# Patient Record
Sex: FEMALE | Race: WHITE | Employment: FULL TIME | URBAN - METROPOLITAN AREA
[De-identification: names, ages, dates, MRNs, and addresses within clinical notes are randomized per-mention and may not be internally consistent; named-entity substitution may affect disease eponyms.]

---

## 2021-02-15 ENCOUNTER — HOSPITAL ENCOUNTER (OUTPATIENT)
Age: 26
Discharge: HOME | End: 2021-02-15
Attending: SPECIALIST | Admitting: SPECIALIST
Payer: COMMERCIAL

## 2021-02-15 VITALS
DIASTOLIC BLOOD PRESSURE: 71 MMHG | SYSTOLIC BLOOD PRESSURE: 123 MMHG | TEMPERATURE: 98.2 F | RESPIRATION RATE: 16 BRPM | HEART RATE: 73 BPM

## 2021-02-15 PROBLEM — Z3A.36 36 WEEKS GESTATION OF PREGNANCY: Status: ACTIVE | Noted: 2021-02-15

## 2021-02-15 PROCEDURE — 59025 FETAL NON-STRESS TEST: CPT

## 2021-02-15 PROCEDURE — 59025 FETAL NON-STRESS TEST: CPT | Performed by: MIDWIFE

## 2021-02-15 NOTE — PROGRESS NOTES
presents to unit for scheduled NST d/t pt taking zoloft and polyhydramnios. Pt denies vaginal bleeding, lof, and reports occasional mild contractions. Perceives positive fetal movement. EDC: 3/12/2021. 36 weeks 3 days gestation. Placed on efm. NST button provided and pt instructed to press button with each fetal movement felt.

## 2021-02-15 NOTE — PROGRESS NOTES
Written and verbal discharge instructions reviewed with pt. Pt verbalized understanding with no further questions or concern. Pt to follow up with her dr with any concerns. Will return for next scheduled NST on Friday. Left unit ambulatory.

## 2021-02-15 NOTE — H&P
Patient is ,No LMP recorded. Patient is pregnant. ,  36w3d here for NST.     Estimated Date of Delivery: 3/12/21    Reason for NST: medication exposure     /71   Pulse 73   Temp 98.2 °F (36.8 °C) (Oral)   Resp 16     Contractions: none    FHR:120,  Variability: moderate,   Accelerations: present, Decelerations: none    Assessment NST is reactive

## 2021-02-19 ENCOUNTER — HOSPITAL ENCOUNTER (OUTPATIENT)
Age: 26
Discharge: HOME | End: 2021-02-19
Attending: SPECIALIST | Admitting: SPECIALIST
Payer: COMMERCIAL

## 2021-02-19 VITALS
SYSTOLIC BLOOD PRESSURE: 118 MMHG | RESPIRATION RATE: 16 BRPM | DIASTOLIC BLOOD PRESSURE: 63 MMHG | TEMPERATURE: 98.1 F | HEART RATE: 80 BPM

## 2021-02-19 PROBLEM — Z3A.37 37 WEEKS GESTATION OF PREGNANCY: Status: ACTIVE | Noted: 2021-02-19

## 2021-02-19 PROCEDURE — 59025 FETAL NON-STRESS TEST: CPT | Performed by: OBSTETRICS & GYNECOLOGY

## 2021-02-19 PROCEDURE — 59025 FETAL NON-STRESS TEST: CPT

## 2021-02-19 SDOH — HEALTH STABILITY: MENTAL HEALTH: HOW OFTEN DO YOU HAVE A DRINK CONTAINING ALCOHOL?: NEVER

## 2021-02-19 NOTE — PROGRESS NOTES
Maternal Vitals  Temp: 98.1 °F (36.7 °C)  Temp Source: Oral  BP: 118/63  Pulse: 80  Resp: 16    Testing  Reason for NST: Polyhydramnios  Explained test to patient: Yes              Plan: Reactive NST - Home    Ellen Puente [facility]  2/19/2021

## 2021-02-19 NOTE — PROGRESS NOTES
Discharge instructions provided and explained to patient. Patient verbalized understanding. Patient has no further questions. Patient left unit ambulatory.

## 2021-02-19 NOTE — PROGRESS NOTES
. 37.0 weeks gestation. Presents to unit for scheduled NST for patient taking zoloft and polyhydramnios. Patient denies lof, vb, ctxs. States positive fm. NST button provided and explained.

## 2021-02-22 ENCOUNTER — HOSPITAL ENCOUNTER (OUTPATIENT)
Age: 26
Discharge: HOME | End: 2021-02-22
Attending: SPECIALIST | Admitting: SPECIALIST
Payer: COMMERCIAL

## 2021-02-22 VITALS
HEART RATE: 75 BPM | TEMPERATURE: 99.2 F | RESPIRATION RATE: 16 BRPM | SYSTOLIC BLOOD PRESSURE: 124 MMHG | DIASTOLIC BLOOD PRESSURE: 68 MMHG

## 2021-02-22 PROBLEM — Z36.89 NST (NON-STRESS TEST) REACTIVE: Status: ACTIVE | Noted: 2021-02-22

## 2021-02-22 PROCEDURE — 59025 FETAL NON-STRESS TEST: CPT

## 2021-02-22 NOTE — PROGRESS NOTES
Patient presents to l&d at 37.3 weeks gestation for scheduled NST. Patient gets NST's for polyhydramnios and took zoloft previously in pregnancy. Patient denies lof, vb, and ctx's. +FM. EFM applied. NST button in hand. .

## 2021-02-26 ENCOUNTER — HOSPITAL ENCOUNTER (OUTPATIENT)
Age: 26
Discharge: HOME | End: 2021-02-26
Attending: SPECIALIST | Admitting: SPECIALIST
Payer: COMMERCIAL

## 2021-02-26 VITALS
SYSTOLIC BLOOD PRESSURE: 120 MMHG | HEART RATE: 75 BPM | TEMPERATURE: 98.3 F | DIASTOLIC BLOOD PRESSURE: 76 MMHG | RESPIRATION RATE: 16 BRPM

## 2021-02-26 PROBLEM — Z3A.38 38 WEEKS GESTATION OF PREGNANCY: Status: ACTIVE | Noted: 2021-02-26

## 2021-02-26 PROCEDURE — 59025 FETAL NON-STRESS TEST: CPT

## 2021-02-26 PROCEDURE — 59025 FETAL NON-STRESS TEST: CPT | Performed by: MIDWIFE

## 2021-02-26 RX ORDER — SERTRALINE HYDROCHLORIDE 100 MG/1
100 TABLET, FILM COATED ORAL DAILY
COMMUNITY

## 2021-02-26 NOTE — H&P
Patient is ,No LMP recorded. Patient is pregnant. ,  38w0d here for NST.     Estimated Date of Delivery: 3/12/21    Reason for NST: polyhydramnios    /76   Pulse 75   Temp 98.3 °F (36.8 °C) (Oral)   Resp 16     Contractions: irregular, patient is only feeling some of them and states they are not painful    FHR:125,  Variability: moderate,   Accelerations: present, Decelerations: none    Assessment NST is reactive   Discussed with Dr. Esther Osorio  Discharge to home

## 2021-02-26 NOTE — PROGRESS NOTES
Discharge instructions given to pt. Pt verbalizes understanding of instructions. Pt leaves unit ambulatory.

## 2021-02-26 NOTE — PROGRESS NOTES
38 weeks presents to L&D for scheduled NST for polyhydramnios. Pt denies ctxs, lof, or vb. States positive fetal movement. EFM applied. NST button given and explained.

## 2021-03-07 ENCOUNTER — APPOINTMENT (OUTPATIENT)
Dept: LABOR AND DELIVERY | Age: 26
End: 2021-03-07
Payer: COMMERCIAL

## 2021-03-07 ENCOUNTER — HOSPITAL ENCOUNTER (INPATIENT)
Age: 26
LOS: 3 days | Discharge: HOME | End: 2021-03-10
Attending: OBSTETRICS & GYNECOLOGY | Admitting: OBSTETRICS & GYNECOLOGY
Payer: COMMERCIAL

## 2021-03-07 ENCOUNTER — ANESTHESIA EVENT (OUTPATIENT)
Dept: LABOR AND DELIVERY | Age: 26
End: 2021-03-07
Payer: COMMERCIAL

## 2021-03-07 ENCOUNTER — ANESTHESIA (OUTPATIENT)
Dept: LABOR AND DELIVERY | Age: 26
End: 2021-03-07
Payer: COMMERCIAL

## 2021-03-07 PROBLEM — O99.820 GBS (GROUP B STREPTOCOCCUS CARRIER), +RV CULTURE, CURRENTLY PREGNANT: Status: ACTIVE | Noted: 2021-03-07

## 2021-03-07 PROBLEM — Z3A.36 36 WEEKS GESTATION OF PREGNANCY: Status: RESOLVED | Noted: 2021-02-15 | Resolved: 2021-03-07

## 2021-03-07 PROBLEM — Z3A.39 39 WEEKS GESTATION OF PREGNANCY: Status: ACTIVE | Noted: 2021-03-07

## 2021-03-07 PROBLEM — Z36.89 NST (NON-STRESS TEST) REACTIVE: Status: RESOLVED | Noted: 2021-02-22 | Resolved: 2021-03-07

## 2021-03-07 PROBLEM — Z3A.37 37 WEEKS GESTATION OF PREGNANCY: Status: RESOLVED | Noted: 2021-02-19 | Resolved: 2021-03-07

## 2021-03-07 PROBLEM — F12.91 HISTORY OF MARIJUANA USE: Status: ACTIVE | Noted: 2021-03-07

## 2021-03-07 PROBLEM — Z28.39 MATERNAL VARICELLA, NON-IMMUNE: Status: ACTIVE | Noted: 2021-03-07

## 2021-03-07 PROBLEM — O09.293 PREGNANCY IN THIRD TRIMESTER WITH HISTORY OF ABORTION: Status: ACTIVE | Noted: 2021-03-07

## 2021-03-07 PROBLEM — O09.93 HIGH-RISK PREGNANCY, THIRD TRIMESTER: Status: ACTIVE | Noted: 2021-03-07

## 2021-03-07 PROBLEM — F41.8 MIXED ANXIETY DEPRESSIVE DISORDER: Status: ACTIVE | Noted: 2021-03-07

## 2021-03-07 PROBLEM — O09.899 MATERNAL VARICELLA, NON-IMMUNE: Status: ACTIVE | Noted: 2021-03-07

## 2021-03-07 PROBLEM — Z3A.38 38 WEEKS GESTATION OF PREGNANCY: Status: RESOLVED | Noted: 2021-02-26 | Resolved: 2021-03-07

## 2021-03-07 PROBLEM — Z72.89 ENGAGES IN VAPING: Status: ACTIVE | Noted: 2021-03-07

## 2021-03-07 LAB
ABO/RH: NORMAL
AMPHETAMINE SCREEN, URINE: NOT DETECTED
ANTIBODY SCREEN: NORMAL
BARBITURATE SCREEN URINE: NOT DETECTED
BENZODIAZEPINE SCREEN, URINE: NOT DETECTED
CANNABINOID SCREEN URINE: POSITIVE
COCAINE METABOLITE SCREEN URINE: NOT DETECTED
FENTANYL SCREEN, URINE: NOT DETECTED
HCT VFR BLD CALC: 30.9 % (ref 34–48)
HEMOGLOBIN: 9.5 G/DL (ref 11.5–15.5)
Lab: ABNORMAL
MCH RBC QN AUTO: 24.6 PG (ref 26–35)
MCHC RBC AUTO-ENTMCNC: 30.7 % (ref 32–34.5)
MCV RBC AUTO: 80.1 FL (ref 80–99.9)
METHADONE SCREEN, URINE: NOT DETECTED
OPIATE SCREEN URINE: NOT DETECTED
OXYCODONE URINE: NOT DETECTED
PDW BLD-RTO: 13.3 FL (ref 11.5–15)
PHENCYCLIDINE SCREEN URINE: NOT DETECTED
PLATELET # BLD: 240 E9/L (ref 130–450)
PMV BLD AUTO: 10.9 FL (ref 7–12)
RBC # BLD: 3.86 E12/L (ref 3.5–5.5)
WBC # BLD: 8.6 E9/L (ref 4.5–11.5)

## 2021-03-07 PROCEDURE — 6370000000 HC RX 637 (ALT 250 FOR IP)

## 2021-03-07 PROCEDURE — 6370000000 HC RX 637 (ALT 250 FOR IP): Performed by: OBSTETRICS & GYNECOLOGY

## 2021-03-07 PROCEDURE — 2500000003 HC RX 250 WO HCPCS

## 2021-03-07 PROCEDURE — 3700000025 EPIDURAL BLOCK: Performed by: ANESTHESIOLOGY

## 2021-03-07 PROCEDURE — 80307 DRUG TEST PRSMV CHEM ANLYZR: CPT

## 2021-03-07 PROCEDURE — 2500000003 HC RX 250 WO HCPCS: Performed by: ANESTHESIOLOGY

## 2021-03-07 PROCEDURE — G0480 DRUG TEST DEF 1-7 CLASSES: HCPCS

## 2021-03-07 PROCEDURE — 1220000001 HC SEMI PRIVATE L&D R&B

## 2021-03-07 PROCEDURE — 36415 COLL VENOUS BLD VENIPUNCTURE: CPT

## 2021-03-07 PROCEDURE — 86900 BLOOD TYPING SEROLOGIC ABO: CPT

## 2021-03-07 PROCEDURE — 85027 COMPLETE CBC AUTOMATED: CPT

## 2021-03-07 PROCEDURE — 86901 BLOOD TYPING SEROLOGIC RH(D): CPT

## 2021-03-07 PROCEDURE — 2500000003 HC RX 250 WO HCPCS: Performed by: OBSTETRICS & GYNECOLOGY

## 2021-03-07 PROCEDURE — 6360000002 HC RX W HCPCS: Performed by: OBSTETRICS & GYNECOLOGY

## 2021-03-07 PROCEDURE — 2580000003 HC RX 258: Performed by: OBSTETRICS & GYNECOLOGY

## 2021-03-07 PROCEDURE — 51701 INSERT BLADDER CATHETER: CPT

## 2021-03-07 PROCEDURE — 86850 RBC ANTIBODY SCREEN: CPT

## 2021-03-07 RX ORDER — CLINDAMYCIN PHOSPHATE 900 MG/50ML
INJECTION INTRAVENOUS
Status: COMPLETED
Start: 2021-03-07 | End: 2021-03-07

## 2021-03-07 RX ORDER — LIDOCAINE HYDROCHLORIDE 10 MG/ML
INJECTION, SOLUTION INFILTRATION; PERINEURAL
Status: COMPLETED
Start: 2021-03-07 | End: 2021-03-08

## 2021-03-07 RX ORDER — ACETAMINOPHEN 650 MG
TABLET, EXTENDED RELEASE ORAL
Status: DISCONTINUED
Start: 2021-03-07 | End: 2021-03-08

## 2021-03-07 RX ORDER — NALBUPHINE HCL 10 MG/ML
5 AMPUL (ML) INJECTION EVERY 4 HOURS PRN
Status: DISCONTINUED | OUTPATIENT
Start: 2021-03-07 | End: 2021-03-08

## 2021-03-07 RX ORDER — BUPIVACAINE HYDROCHLORIDE 2.5 MG/ML
INJECTION, SOLUTION EPIDURAL; INFILTRATION; INTRACAUDAL
Status: DISCONTINUED
Start: 2021-03-07 | End: 2021-03-08

## 2021-03-07 RX ORDER — NALOXONE HYDROCHLORIDE 0.4 MG/ML
0.4 INJECTION, SOLUTION INTRAMUSCULAR; INTRAVENOUS; SUBCUTANEOUS PRN
Status: DISCONTINUED | OUTPATIENT
Start: 2021-03-07 | End: 2021-03-08

## 2021-03-07 RX ORDER — CLINDAMYCIN PHOSPHATE 900 MG/50ML
900 INJECTION INTRAVENOUS EVERY 8 HOURS
Status: DISCONTINUED | OUTPATIENT
Start: 2021-03-07 | End: 2021-03-08

## 2021-03-07 RX ORDER — ACETAMINOPHEN 325 MG/1
650 TABLET ORAL EVERY 4 HOURS PRN
Status: DISCONTINUED | OUTPATIENT
Start: 2021-03-07 | End: 2021-03-08

## 2021-03-07 RX ORDER — SODIUM CHLORIDE, SODIUM LACTATE, POTASSIUM CHLORIDE, CALCIUM CHLORIDE 600; 310; 30; 20 MG/100ML; MG/100ML; MG/100ML; MG/100ML
INJECTION, SOLUTION INTRAVENOUS CONTINUOUS
Status: DISCONTINUED | OUTPATIENT
Start: 2021-03-07 | End: 2021-03-08

## 2021-03-07 RX ORDER — ACETAMINOPHEN 325 MG/1
TABLET ORAL
Status: DISCONTINUED
Start: 2021-03-07 | End: 2021-03-08

## 2021-03-07 RX ORDER — ONDANSETRON 2 MG/ML
4 INJECTION INTRAMUSCULAR; INTRAVENOUS EVERY 6 HOURS PRN
Status: DISCONTINUED | OUTPATIENT
Start: 2021-03-07 | End: 2021-03-08

## 2021-03-07 RX ADMIN — Medication 25 MCG: at 07:19

## 2021-03-07 RX ADMIN — SODIUM CHLORIDE, POTASSIUM CHLORIDE, SODIUM LACTATE AND CALCIUM CHLORIDE: 600; 310; 30; 20 INJECTION, SOLUTION INTRAVENOUS at 16:52

## 2021-03-07 RX ADMIN — Medication 10 ML/HR: at 16:40

## 2021-03-07 RX ADMIN — CLINDAMYCIN IN 5 PERCENT DEXTROSE 900 MG: 18 INJECTION, SOLUTION INTRAVENOUS at 15:15

## 2021-03-07 RX ADMIN — SODIUM CHLORIDE, POTASSIUM CHLORIDE, SODIUM LACTATE AND CALCIUM CHLORIDE: 600; 310; 30; 20 INJECTION, SOLUTION INTRAVENOUS at 12:26

## 2021-03-07 RX ADMIN — BUTORPHANOL TARTRATE 2 MG: 2 INJECTION, SOLUTION INTRAMUSCULAR; INTRAVENOUS at 11:38

## 2021-03-07 RX ADMIN — SODIUM CHLORIDE, POTASSIUM CHLORIDE, SODIUM LACTATE AND CALCIUM CHLORIDE: 600; 310; 30; 20 INJECTION, SOLUTION INTRAVENOUS at 15:55

## 2021-03-07 RX ADMIN — CLINDAMYCIN IN 5 PERCENT DEXTROSE 900 MG: 18 INJECTION, SOLUTION INTRAVENOUS at 07:19

## 2021-03-07 RX ADMIN — ACETAMINOPHEN 650 MG: 325 TABLET ORAL at 21:18

## 2021-03-07 RX ADMIN — Medication 1 MILLI-UNITS/MIN: at 20:46

## 2021-03-07 RX ADMIN — CLINDAMYCIN IN 5 PERCENT DEXTROSE 900 MG: 18 INJECTION, SOLUTION INTRAVENOUS at 23:28

## 2021-03-07 RX ADMIN — SODIUM CHLORIDE, POTASSIUM CHLORIDE, SODIUM LACTATE AND CALCIUM CHLORIDE: 600; 310; 30; 20 INJECTION, SOLUTION INTRAVENOUS at 06:45

## 2021-03-07 RX ADMIN — Medication 15 ML/HR: at 23:50

## 2021-03-07 RX ADMIN — Medication 25 MCG: at 11:30

## 2021-03-07 RX ADMIN — SODIUM CHLORIDE, POTASSIUM CHLORIDE, SODIUM LACTATE AND CALCIUM CHLORIDE: 600; 310; 30; 20 INJECTION, SOLUTION INTRAVENOUS at 18:31

## 2021-03-07 ASSESSMENT — PAIN SCALES - GENERAL
PAINLEVEL_OUTOF10: 7
PAINLEVEL_OUTOF10: 3

## 2021-03-07 NOTE — PROGRESS NOTES
Dr Quirino Miller updated on pt comfortable with her epidural. IUPC placed. SVE 4/90/-1 at that time. No new orders at this time.

## 2021-03-07 NOTE — PROGRESS NOTES
Pt presents to LD for IOL. 39.2 weeks . Pt states +FM, denies LOF or VB. Pt seeing EL Robert for polyhydramnios. EFM applied, FHT reassuring, ctx presents.

## 2021-03-07 NOTE — PROGRESS NOTES
Dr Sheriff Tomy updated on SROM at [phone]. Contractions every 1-3 minutes, FHTs moderate variability, 120s. Pt currently bolusing for an epidural.  states to have house officer place an IUPC after epidural placement.

## 2021-03-07 NOTE — ANESTHESIA PRE PROCEDURE
Department of Anesthesiology  Preprocedure Note       Name:  Kenneth Lopez   Age:  30 y.o.  :  1995                                          MRN:  [id number]         Date:  3/7/2021      Surgeon:  Carlin Valentin    Procedure:     Medications prior to admission:   Prior to Admission medications    Medication Sig Start Date End Date Taking? Authorizing Provider   Prenatal MV-Min-Fe Fum-FA-DHA (PRENATAL 1 PO) Take 1 tablet by mouth daily   Yes Historical Provider, MD   sertraline (ZOLOFT) 100 MG tablet Take 100 mg by mouth daily   Yes Historical Provider, MD       Current medications:    Current Facility-Administered Medications   Medication Dose Route Frequency Provider Last Rate Last Admin    lactated ringers infusion   Intravenous Continuous Constanza Bernal  mL/hr at 21 0645 New Bag at 21 0645    oxytocin (PITOCIN) 10 unit bolus from the bag  10 Units Intravenous PRN Constanza Benral MD        And    oxytocin (PITOCIN) 30 units in 500 mL infusion  87.3 russel-units/min Intravenous PRN Constanza Bernal MD        [facility]) injection 4 mg  4 mg Intravenous Q6H PRN Constanza Bernal MD        clindamycin (CLEOCIN) 900 mg in dextrose 5 % 50 mL IVPB  900 mg Intravenous Lunda Aase, MD   Stopped at 21 0819    miSOPROStol (CYTOTEC) pre-split tablet TABS 25 mcg  25 mcg Vaginal Q4H Constanza Bernal MD   25 mcg at 21 0719    butorphanol (STADOL) injection 2 mg  2 mg Intravenous Q3H PRN Constanza Bernal MD           Allergies:     Allergies   Allergen Reactions    Augmentin [Amoxicillin-Pot Clavulanate] Nausea And Vomiting       Problem List:    Patient Active Problem List   Diagnosis Code    36 weeks gestation of pregnancy Z3A.36    Medication exposure during first trimester of pregnancy O09.891    37 weeks gestation of pregnancy Z3A.37    NST (non-stress test) reactive Z36.89    38 weeks gestation of pregnancy Z3A.38    Polyhydramnios in third trimester O40. 3XX0    39 weeks gestation of pregnancy Z3A.39       Past Medical History:        Diagnosis Date    Mental disorder     ANXIETY AND DEPRESSION       Past Surgical History:        Procedure Laterality Date    WISDOM TOOTH EXTRACTION         Social History:    Social History     Tobacco Use    Smoking status: Never Smoker    Smokeless tobacco: Never Used   Substance Use Topics    Alcohol use: Not Currently     Frequency: Never                                Counseling given: Not Answered      Vital Signs (Current):   Vitals:    03/07/21 0630 03/07/21 0644 03/07/21 0724   BP:  129/65 131/73   Pulse:  83 84   Resp:  14 16   Temp:  37.1 °C (98.7 °F) 37.1 °C (98.8 °F)   TempSrc:  Oral Oral   Weight: 157 lb (71.2 kg)     Height: 5' 1\" (1.549 m)                                                BP Readings from Last 3 Encounters:   03/07/21 131/73   03/04/21 120/78   02/26/21 120/76       NPO Status:                                                                                 BMI:   Wt Readings from Last 3 Encounters:   03/07/21 157 lb (71.2 kg)   03/04/21 157 lb 12.8 oz (71.6 kg)     Body mass index is 29.66 kg/m². CBC:   Lab Results   Component Value Date    WBC 8.6 03/07/2021    RBC 3.86 03/07/2021    HGB 9.5 03/07/2021    HCT 30.9 03/07/2021    MCV 80.1 03/07/2021    RDW 13.3 03/07/2021     03/07/2021       CMP: No results found for: NA, K, CL, CO2, BUN, CREATININE, GFRAA, AGRATIO, LABGLOM, GLUCOSE, PROT, CALCIUM, BILITOT, ALKPHOS, AST, ALT    POC Tests: No results for input(s): POCGLU, POCNA, POCK, POCCL, POCBUN, POCHEMO, POCHCT in the last 72 hours.     Coags: No results found for: PROTIME, INR, APTT    HCG (If Applicable): No results found for: PREGTESTUR, PREGSERUM, HCG, HCGQUANT     ABGs: No results found for: PHART, PO2ART, QVO4MXF, JYT8IQJ, BEART, G0CZNLNX     Type & Screen (If Applicable):  No results found for: LABABO, LABRH    Drug/Infectious Status (If Applicable):  No results found for: HIV, HEPCAB    COVID-19 Screening (If Applicable): No results found for: COVID19      Anesthesia Evaluation  Patient summary reviewed and Nursing notes reviewed no history of anesthetic complications (denies self and family hx): Airway: Mallampati: II  TM distance: >3 FB   Neck ROM: full  Comment: Spann leftt nare  Mouth opening: > = 3 FB Dental:          Pulmonary: breath sounds clear to auscultation            Patient did not smoke on day of surgery. ROS comment: Hx marijuana last used 2 days ago   Cardiovascular:Negative CV ROS            Rhythm: regular  Rate: normal                    Neuro/Psych:   (+) neuromuscular disease:, headaches (not taking medications last migraine 2019): migraine headaches, psychiatric history: stable with treatmentdepression/anxiety              ROS comment: Muscular dystrophy diagnosed as a child weakness upper and lower extremities  Bilateral foot drop noted while pt in bed GI/Hepatic/Renal:   (+) GERD (otc): well controlled,          ROS comment: [place] teeth extraction 8 years ago. Endo/Other: Negative Endo/Other ROS                    Abdominal:           Vascular:                                  Anesthesia Plan      general, spinal and epidural     ASA 2     (Risks and benefits discussed agree to proceed with epidural)        Anesthetic plan and risks discussed with patient and spouse. Use of blood products discussed with patient whom consented to blood products. Plan discussed with attending. BELÉN Gotti - CRNA   3/7/2021    DOS STAFF ADDENDUM:    Pt seen and examined, chart reviewed (including anesthesia, drug and allergy history). Anesthetic plan, risks, benefits, alternatives, and personnel involved discussed with patient. Patient verbalized an understanding and agrees to proceed. Plan discussed with care team members and agreed upon.     Jayne Tim MD  Staff Anesthesiologist  3:44 AM

## 2021-03-07 NOTE — H&P
Department of Obstetrics and Gynecology  Labor and Delivery  History & Physical    Patient:  Faviola Dang Date:  3/7/2021  5:54 AM  Medical Record Number:  [id number]    CHIEF COMPLAINT: High risk pregnancy at 39 weeks 2 days with polyhydramnios and a BS = 4, for Cytotec induction of labor    PROBLEM LIST:     Patient Active Problem List   Diagnosis    Medication exposure during first trimester of pregnancy (Zoloft throuhout the pregnancy)    Polyhydramnios in third trimester    39 2/7 weeks gestation of pregnancy    History of marijuana use - first trimester    Engages in [address] Pregnancy in third trimester with history of     Maternal varicella, non-immune    Mixed anxiety depressive disorder - on Zoloft    GBS (group B Streptococcus carrier), +RV culture, currently pregnant    High-risk pregnancy, third trimester - followed with Dr Scooter Felix:    The patient is a 30 y.o. W female  at 44w2d. Patient presents as a late transfer from Dr. Mary Melendrez with polyhydramnios at 38 weeks and 6 days with a recommendation for labor induction at 39 weeks by maternal-fetal medicine Dr. Casie Young. Patient has been taking Zoloft 100 mg for mixed anxiety depressive disorder throughout the course of her pregnancy and also has a history of marijuana use and vaping. She was diagnosed with muscular dystrophy in childhood but has not any complications this during her pregnancy. She was having some mild irregular contractions on admission but denied leaking fluid, vaginal bleeding. She has no symptoms of UTI or PIH. There is good fetal movement.     ESTIMATED DUE DATE: Estimated Date of Delivery: 3/12/21    PRENATAL CARE:  Complicated by: See HPI and problem list  GBS: positive    Past OB History  OB History        2    Para        Term                AB   1    Living           SAB        TAB   1    Ectopic        Molar        Multiple        Live ng/mL    Cannabinoid Scrn, Ur POSITIVE (A) Negative < 50ng/mL    Cocaine Metabolite Screen, Urine NOT DETECTED Negative < 300 ng/mL    Opiate Scrn, Ur NOT DETECTED Negative < 300ng/mL    PCP Screen, Urine NOT DETECTED Negative < 25 ng/mL    Methadone Screen, Urine NOT DETECTED Negative <300 ng/mL    Oxycodone Urine NOT DETECTED Negative <100 ng/mL    FENTANYL SCREEN, URINE NOT DETECTED Negative <1 ng/mL    Drug Screen Comment: see below    TYPE AND SCREEN    Collection Time: 21  6:45 AM   Result Value Ref Range    ABO/Rh O POS     Antibody Screen NEG    CBC    Collection Time: 21  6:45 AM   Result Value Ref Range    WBC 8.6 4.5 - 11.5 E9/L    RBC 3.86 3.50 - 5.50 E12/L    Hemoglobin 9.5 (L) 11.5 - 15.5 g/dL    Hematocrit 30.9 (L) 34.0 - 48.0 %    MCV 80.1 80.0 - 99.9 fL    MCH 24.6 (L) 26.0 - 35.0 pg    MCHC 30.7 (L) 32.0 - 34.5 %    RDW 13.3 11.5 - 15.0 fL    Platelets 783 348 - 837 E9/L    MPV 10.9 7.0 - 12.0 fL       ASSESSMENT:  30 y.o.  W female at 39w2d [id number]  Polyhydramnios -recommended for delivery by Dr. Nancie Reed  GBS positive  Late transfer at 45 weeks 6 days from Dr. Javier Plants practice  History of medication exposure during pregnancy -Zoloft 10 mg daily  History of anxiety depressive disorder  History of marijuana use and vaping during pregnancy  Maternal varicella nonimmune  Patient Active Problem List   Diagnosis    Medication exposure during first trimester of pregnancy (Zoloft throuhout the pregnancy)    Polyhydramnios in third trimester    39 2/7 weeks gestation of pregnancy    History of marijuana use - first trimester    Engages in [address] Pregnancy in third trimester with history of     Maternal varicella, non-immune    Mixed anxiety depressive disorder - on Zoloft    GBS (group B Streptococcus carrier), +RV culture, currently pregnant    High-risk pregnancy, third trimester - followed with Dr Nancie Reed     Fetus: Reassuring  GBS: positive    PLAN:  Orders Placed This Encounter   Procedures    Urine Drug Screen    CBC    DIET CLEAR LIQUID;    Vital signs per unit routine    Notify physician for abnormal lab results, nonreassuring fetal status, nonprogressive labor, impending delivery    Up with assistance    Continuous external fetal heart rate monitoring    External uterine contraction monitoring    I/O per unit routine    Place intermittent pneumatic compression device when not ambulatory    Patient may have epidural    Monitor for signs/symptoms of urinary retention    Full Code    Inpatient consult to Social Work    TYPE AND SCREEN    PATIENT STATUS (DIRECT) Inpatient     Current Facility-Administered Medications   Medication Dose Route Frequency Provider Last Rate Last Admin    ondansetron (ZOFRAN) injection 4 mg  4 mg Intravenous Q6H PRN Godfrey Ocasio MD        clindamycin (CLEOCIN) 900 mg in dextrose 5 % 50 mL IVPB  900 mg Intravenous Manohar Cuevas MD   Stopped at 21 0819    miSOPROStol (CYTOTEC) pre-split tablet TABS 25 mcg  25 mcg Vaginal Q4H Godfrey Ocasio MD   25 mcg at 21 1130    butorphanol (STADOL) injection 2 mg  2 mg Intravenous Q3H PRN Godfrey Ocasio MD   2 mg at 21 1138    ondansetron (ZOFRAN) injection 4 mg  4 mg Intravenous Q6H PRN Radu Martinez MD       Patient was counseled on the risks of labor induction with Cytotec including the increased risk of  section and its associated risks. She accepts these risks and agrees to proceed as planned. Godfrey Ocasio M.D.  FACOG  3/7/2021 1:06 PM

## 2021-03-08 VITALS — OXYGEN SATURATION: 98 % | SYSTOLIC BLOOD PRESSURE: 117 MMHG | DIASTOLIC BLOOD PRESSURE: 60 MMHG

## 2021-03-08 PROBLEM — D50.9 IRON DEFICIENCY ANEMIA OF PREGNANCY: Status: ACTIVE | Noted: 2021-03-08

## 2021-03-08 PROBLEM — O99.019 IRON DEFICIENCY ANEMIA OF PREGNANCY: Status: ACTIVE | Noted: 2021-03-08

## 2021-03-08 PROCEDURE — 6360000002 HC RX W HCPCS: Performed by: OBSTETRICS & GYNECOLOGY

## 2021-03-08 PROCEDURE — 2500000003 HC RX 250 WO HCPCS: Performed by: OBSTETRICS & GYNECOLOGY

## 2021-03-08 PROCEDURE — 3700000001 HC ADD 15 MINUTES (ANESTHESIA): Performed by: OBSTETRICS & GYNECOLOGY

## 2021-03-08 PROCEDURE — 7100000001 HC PACU RECOVERY - ADDTL 15 MIN: Performed by: OBSTETRICS & GYNECOLOGY

## 2021-03-08 PROCEDURE — 2709999900 HC NON-CHARGEABLE SUPPLY: Performed by: OBSTETRICS & GYNECOLOGY

## 2021-03-08 PROCEDURE — 6360000002 HC RX W HCPCS: Performed by: ANESTHESIOLOGY

## 2021-03-08 PROCEDURE — 6370000000 HC RX 637 (ALT 250 FOR IP): Performed by: OBSTETRICS & GYNECOLOGY

## 2021-03-08 PROCEDURE — 59514 CESAREAN DELIVERY ONLY: CPT | Performed by: OBSTETRICS & GYNECOLOGY

## 2021-03-08 PROCEDURE — 2580000003 HC RX 258: Performed by: OBSTETRICS & GYNECOLOGY

## 2021-03-08 PROCEDURE — 3609079900 HC CESAREAN SECTION: Performed by: OBSTETRICS & GYNECOLOGY

## 2021-03-08 PROCEDURE — 6370000000 HC RX 637 (ALT 250 FOR IP)

## 2021-03-08 PROCEDURE — 2500000003 HC RX 250 WO HCPCS: Performed by: NURSE ANESTHETIST, CERTIFIED REGISTERED

## 2021-03-08 PROCEDURE — 7100000000 HC PACU RECOVERY - FIRST 15 MIN: Performed by: OBSTETRICS & GYNECOLOGY

## 2021-03-08 PROCEDURE — 6360000002 HC RX W HCPCS

## 2021-03-08 PROCEDURE — 51701 INSERT BLADDER CATHETER: CPT

## 2021-03-08 PROCEDURE — 88307 TISSUE EXAM BY PATHOLOGIST: CPT

## 2021-03-08 PROCEDURE — 6360000002 HC RX W HCPCS: Performed by: NURSE ANESTHETIST, CERTIFIED REGISTERED

## 2021-03-08 PROCEDURE — 94761 N-INVAS EAR/PLS OXIMETRY MLT: CPT

## 2021-03-08 PROCEDURE — 1220000000 HC SEMI PRIVATE OB R&B

## 2021-03-08 PROCEDURE — 3700000000 HC ANESTHESIA ATTENDED CARE: Performed by: OBSTETRICS & GYNECOLOGY

## 2021-03-08 RX ORDER — ACETAMINOPHEN 325 MG/1
650 TABLET ORAL EVERY 4 HOURS PRN
Status: DISCONTINUED | OUTPATIENT
Start: 2021-03-08 | End: 2021-03-10 | Stop reason: HOSPADM

## 2021-03-08 RX ORDER — SODIUM CHLORIDE, SODIUM LACTATE, POTASSIUM CHLORIDE, CALCIUM CHLORIDE 600; 310; 30; 20 MG/100ML; MG/100ML; MG/100ML; MG/100ML
INJECTION, SOLUTION INTRAVENOUS CONTINUOUS
Status: DISCONTINUED | OUTPATIENT
Start: 2021-03-08 | End: 2021-03-10 | Stop reason: HOSPADM

## 2021-03-08 RX ORDER — ACETAMINOPHEN 325 MG/1
325 TABLET ORAL EVERY 4 HOURS PRN
Status: DISCONTINUED | OUTPATIENT
Start: 2021-03-08 | End: 2021-03-10 | Stop reason: HOSPADM

## 2021-03-08 RX ORDER — LIDOCAINE HYDROCHLORIDE 10 MG/ML
INJECTION, SOLUTION INFILTRATION; PERINEURAL PRN
Status: DISCONTINUED | OUTPATIENT
Start: 2021-03-08 | End: 2021-03-08 | Stop reason: SDUPTHER

## 2021-03-08 RX ORDER — SODIUM CHLORIDE, SODIUM LACTATE, POTASSIUM CHLORIDE, AND CALCIUM CHLORIDE .6; .31; .03; .02 G/100ML; G/100ML; G/100ML; G/100ML
1000 INJECTION, SOLUTION INTRAVENOUS ONCE
Status: DISCONTINUED | OUTPATIENT
Start: 2021-03-08 | End: 2021-03-08

## 2021-03-08 RX ORDER — SODIUM CHLORIDE 0.9 % (FLUSH) 0.9 %
10 SYRINGE (ML) INJECTION EVERY 12 HOURS SCHEDULED
Status: DISCONTINUED | OUTPATIENT
Start: 2021-03-08 | End: 2021-03-10 | Stop reason: HOSPADM

## 2021-03-08 RX ORDER — OXYCODONE HYDROCHLORIDE 5 MG/1
5 TABLET ORAL EVERY 4 HOURS PRN
Status: DISCONTINUED | OUTPATIENT
Start: 2021-03-08 | End: 2021-03-10 | Stop reason: HOSPADM

## 2021-03-08 RX ORDER — ONDANSETRON 2 MG/ML
4 INJECTION INTRAMUSCULAR; INTRAVENOUS EVERY 6 HOURS PRN
Status: DISCONTINUED | OUTPATIENT
Start: 2021-03-08 | End: 2021-03-10 | Stop reason: HOSPADM

## 2021-03-08 RX ORDER — SODIUM CHLORIDE 0.9 % (FLUSH) 0.9 %
10 SYRINGE (ML) INJECTION PRN
Status: DISCONTINUED | OUTPATIENT
Start: 2021-03-08 | End: 2021-03-08

## 2021-03-08 RX ORDER — OXYCODONE HYDROCHLORIDE AND ACETAMINOPHEN 5; 325 MG/1; MG/1
1 TABLET ORAL EVERY 4 HOURS PRN
Status: DISCONTINUED | OUTPATIENT
Start: 2021-03-09 | End: 2021-03-10 | Stop reason: HOSPADM

## 2021-03-08 RX ORDER — OXYCODONE HYDROCHLORIDE AND ACETAMINOPHEN 5; 325 MG/1; MG/1
2 TABLET ORAL EVERY 4 HOURS PRN
Status: DISCONTINUED | OUTPATIENT
Start: 2021-03-09 | End: 2021-03-10 | Stop reason: HOSPADM

## 2021-03-08 RX ORDER — NALBUPHINE HCL 10 MG/ML
5 AMPUL (ML) INJECTION EVERY 4 HOURS PRN
Status: ACTIVE | OUTPATIENT
Start: 2021-03-08 | End: 2021-03-09

## 2021-03-08 RX ORDER — TRISODIUM CITRATE DIHYDRATE AND CITRIC ACID MONOHYDRATE 500; 334 MG/5ML; MG/5ML
30 SOLUTION ORAL ONCE
Status: COMPLETED | OUTPATIENT
Start: 2021-03-08 | End: 2021-03-08

## 2021-03-08 RX ORDER — NALOXONE HYDROCHLORIDE 0.4 MG/ML
0.4 INJECTION, SOLUTION INTRAMUSCULAR; INTRAVENOUS; SUBCUTANEOUS PRN
Status: DISCONTINUED | OUTPATIENT
Start: 2021-03-08 | End: 2021-03-10 | Stop reason: HOSPADM

## 2021-03-08 RX ORDER — MODIFIED LANOLIN
OINTMENT (GRAM) TOPICAL
Status: DISCONTINUED | OUTPATIENT
Start: 2021-03-08 | End: 2021-03-10 | Stop reason: HOSPADM

## 2021-03-08 RX ORDER — METRONIDAZOLE 500 MG/1
500 TABLET ORAL EVERY 8 HOURS SCHEDULED
Status: COMPLETED | OUTPATIENT
Start: 2021-03-08 | End: 2021-03-09

## 2021-03-08 RX ORDER — TRISODIUM CITRATE DIHYDRATE AND CITRIC ACID MONOHYDRATE 500; 334 MG/5ML; MG/5ML
SOLUTION ORAL
Status: COMPLETED
Start: 2021-03-08 | End: 2021-03-08

## 2021-03-08 RX ORDER — KETOROLAC TROMETHAMINE 30 MG/ML
30 INJECTION, SOLUTION INTRAMUSCULAR; INTRAVENOUS EVERY 6 HOURS
Status: COMPLETED | OUTPATIENT
Start: 2021-03-08 | End: 2021-03-09

## 2021-03-08 RX ORDER — IBUPROFEN 600 MG/1
600 TABLET ORAL EVERY 6 HOURS PRN
Status: DISCONTINUED | OUTPATIENT
Start: 2021-03-09 | End: 2021-03-10 | Stop reason: HOSPADM

## 2021-03-08 RX ORDER — AMMONIA INHALANTS 0.04 G/.3ML
INHALANT RESPIRATORY (INHALATION)
Status: DISCONTINUED
Start: 2021-03-08 | End: 2021-03-08

## 2021-03-08 RX ORDER — SODIUM CHLORIDE, SODIUM LACTATE, POTASSIUM CHLORIDE, CALCIUM CHLORIDE 600; 310; 30; 20 MG/100ML; MG/100ML; MG/100ML; MG/100ML
INJECTION, SOLUTION INTRAVENOUS CONTINUOUS
Status: DISCONTINUED | OUTPATIENT
Start: 2021-03-08 | End: 2021-03-08

## 2021-03-08 RX ORDER — DOCUSATE SODIUM 100 MG/1
100 CAPSULE, LIQUID FILLED ORAL 2 TIMES DAILY
Status: DISCONTINUED | OUTPATIENT
Start: 2021-03-08 | End: 2021-03-10 | Stop reason: HOSPADM

## 2021-03-08 RX ORDER — FERROUS SULFATE 325(65) MG
325 TABLET ORAL 2 TIMES DAILY WITH MEALS
Status: DISCONTINUED | OUTPATIENT
Start: 2021-03-08 | End: 2021-03-10 | Stop reason: HOSPADM

## 2021-03-08 RX ORDER — PRENATAL WITH FERROUS FUM AND FOLIC ACID 3080; 920; 120; 400; 22; 1.84; 3; 20; 10; 1; 12; 200; 27; 25; 2 [IU]/1; [IU]/1; MG/1; [IU]/1; MG/1; MG/1; MG/1; MG/1; MG/1; MG/1; UG/1; MG/1; MG/1; MG/1; MG/1
1 TABLET ORAL DAILY
Status: DISCONTINUED | OUTPATIENT
Start: 2021-03-08 | End: 2021-03-10 | Stop reason: HOSPADM

## 2021-03-08 RX ORDER — SERTRALINE HYDROCHLORIDE 100 MG/1
100 TABLET, FILM COATED ORAL DAILY
Status: DISCONTINUED | OUTPATIENT
Start: 2021-03-08 | End: 2021-03-10 | Stop reason: HOSPADM

## 2021-03-08 RX ORDER — MORPHINE SULFATE 1 MG/ML
INJECTION, SOLUTION EPIDURAL; INTRATHECAL; INTRAVENOUS PRN
Status: DISCONTINUED | OUTPATIENT
Start: 2021-03-08 | End: 2021-03-08 | Stop reason: SDUPTHER

## 2021-03-08 RX ORDER — SODIUM CHLORIDE 0.9 % (FLUSH) 0.9 %
10 SYRINGE (ML) INJECTION PRN
Status: DISCONTINUED | OUTPATIENT
Start: 2021-03-08 | End: 2021-03-10 | Stop reason: HOSPADM

## 2021-03-08 RX ORDER — SODIUM CHLORIDE 0.9 % (FLUSH) 0.9 %
10 SYRINGE (ML) INJECTION EVERY 12 HOURS SCHEDULED
Status: DISCONTINUED | OUTPATIENT
Start: 2021-03-08 | End: 2021-03-08

## 2021-03-08 RX ORDER — BUPIVACAINE HYDROCHLORIDE 7.5 MG/ML
INJECTION, SOLUTION INTRASPINAL PRN
Status: DISCONTINUED | OUTPATIENT
Start: 2021-03-08 | End: 2021-03-08 | Stop reason: SDUPTHER

## 2021-03-08 RX ORDER — KETOROLAC TROMETHAMINE 30 MG/ML
30 INJECTION, SOLUTION INTRAMUSCULAR; INTRAVENOUS EVERY 6 HOURS
Status: DISCONTINUED | OUTPATIENT
Start: 2021-03-08 | End: 2021-03-08 | Stop reason: SDUPTHER

## 2021-03-08 RX ORDER — OXYCODONE HYDROCHLORIDE 5 MG/1
10 TABLET ORAL EVERY 4 HOURS PRN
Status: DISCONTINUED | OUTPATIENT
Start: 2021-03-08 | End: 2021-03-10 | Stop reason: HOSPADM

## 2021-03-08 RX ORDER — SIMETHICONE 80 MG
80 TABLET,CHEWABLE ORAL EVERY 6 HOURS PRN
Status: DISCONTINUED | OUTPATIENT
Start: 2021-03-08 | End: 2021-03-10 | Stop reason: HOSPADM

## 2021-03-08 RX ORDER — PROMETHAZINE HYDROCHLORIDE 25 MG/ML
INJECTION, SOLUTION INTRAMUSCULAR; INTRAVENOUS PRN
Status: DISCONTINUED | OUTPATIENT
Start: 2021-03-08 | End: 2021-03-08 | Stop reason: SDUPTHER

## 2021-03-08 RX ORDER — ONDANSETRON 2 MG/ML
INJECTION INTRAMUSCULAR; INTRAVENOUS PRN
Status: DISCONTINUED | OUTPATIENT
Start: 2021-03-08 | End: 2021-03-08 | Stop reason: SDUPTHER

## 2021-03-08 RX ORDER — BISACODYL 10 MG
10 SUPPOSITORY, RECTAL RECTAL DAILY PRN
Status: DISCONTINUED | OUTPATIENT
Start: 2021-03-10 | End: 2021-03-10 | Stop reason: HOSPADM

## 2021-03-08 RX ORDER — DIPHENHYDRAMINE HYDROCHLORIDE 50 MG/ML
25 INJECTION INTRAMUSCULAR; INTRAVENOUS EVERY 6 HOURS PRN
Status: DISCONTINUED | OUTPATIENT
Start: 2021-03-08 | End: 2021-03-10 | Stop reason: HOSPADM

## 2021-03-08 RX ADMIN — Medication: at 11:42

## 2021-03-08 RX ADMIN — KETOROLAC TROMETHAMINE 30 MG: 30 INJECTION, SOLUTION INTRAMUSCULAR; INTRAVENOUS at 13:15

## 2021-03-08 RX ADMIN — METRONIDAZOLE 500 MG: 500 TABLET ORAL at 06:27

## 2021-03-08 RX ADMIN — MORPHINE SULFATE 0.15 MG: 1 INJECTION, SOLUTION EPIDURAL; INTRATHECAL; INTRAVENOUS at 03:37

## 2021-03-08 RX ADMIN — Medication 909 ML/HR: at 03:53

## 2021-03-08 RX ADMIN — BUPIVACAINE HYDROCHLORIDE IN DEXTROSE 1.2 ML: 7.5 INJECTION, SOLUTION SUBARACHNOID at 03:37

## 2021-03-08 RX ADMIN — DOCUSATE SODIUM 100 MG: 100 CAPSULE, LIQUID FILLED ORAL at 11:42

## 2021-03-08 RX ADMIN — SODIUM CHLORIDE, POTASSIUM CHLORIDE, SODIUM LACTATE AND CALCIUM CHLORIDE: 600; 310; 30; 20 INJECTION, SOLUTION INTRAVENOUS at 03:32

## 2021-03-08 RX ADMIN — METRONIDAZOLE 500 MG: 500 TABLET ORAL at 22:53

## 2021-03-08 RX ADMIN — DOCUSATE SODIUM 100 MG: 100 CAPSULE, LIQUID FILLED ORAL at 20:36

## 2021-03-08 RX ADMIN — FAMOTIDINE 20 MG: 10 INJECTION INTRAVENOUS at 11:43

## 2021-03-08 RX ADMIN — CEFAZOLIN 1000 MG: 1 INJECTION, POWDER, FOR SOLUTION INTRAMUSCULAR; INTRAVENOUS at 11:40

## 2021-03-08 RX ADMIN — PROMETHAZINE HYDROCHLORIDE 6.25 MG: 25 INJECTION INTRAMUSCULAR; INTRAVENOUS at 04:16

## 2021-03-08 RX ADMIN — SIMETHICONE 80 MG: 80 TABLET, CHEWABLE ORAL at 11:42

## 2021-03-08 RX ADMIN — LIDOCAINE HYDROCHLORIDE 3 ML: 10 INJECTION, SOLUTION INFILTRATION; PERINEURAL at 03:37

## 2021-03-08 RX ADMIN — METRONIDAZOLE 500 MG: 500 TABLET ORAL at 14:30

## 2021-03-08 RX ADMIN — SODIUM CHLORIDE, POTASSIUM CHLORIDE, SODIUM LACTATE AND CALCIUM CHLORIDE: 600; 310; 30; 20 INJECTION, SOLUTION INTRAVENOUS at 04:21

## 2021-03-08 RX ADMIN — SODIUM CHLORIDE, POTASSIUM CHLORIDE, SODIUM LACTATE AND CALCIUM CHLORIDE: 600; 310; 30; 20 INJECTION, SOLUTION INTRAVENOUS at 17:51

## 2021-03-08 RX ADMIN — TRISODIUM CITRATE DIHYDRATE AND CITRIC ACID MONOHYDRATE 30 ML: 500; 334 SOLUTION ORAL at 03:03

## 2021-03-08 RX ADMIN — SERTRALINE 100 MG: 100 TABLET, FILM COATED ORAL at 22:53

## 2021-03-08 RX ADMIN — SODIUM CHLORIDE, POTASSIUM CHLORIDE, SODIUM LACTATE AND CALCIUM CHLORIDE: 600; 310; 30; 20 INJECTION, SOLUTION INTRAVENOUS at 14:12

## 2021-03-08 RX ADMIN — Medication 2000 MG: at 03:03

## 2021-03-08 RX ADMIN — SODIUM CHLORIDE, PRESERVATIVE FREE 10 ML: 5 INJECTION INTRAVENOUS at 11:43

## 2021-03-08 RX ADMIN — KETOROLAC TROMETHAMINE 30 MG: 30 INJECTION, SOLUTION INTRAMUSCULAR; INTRAVENOUS at 19:23

## 2021-03-08 RX ADMIN — GENTAMICIN SULFATE 110 MG: 40 INJECTION, SOLUTION INTRAMUSCULAR; INTRAVENOUS at 01:12

## 2021-03-08 RX ADMIN — ONDANSETRON 4 MG: 2 INJECTION INTRAMUSCULAR; INTRAVENOUS at 03:54

## 2021-03-08 RX ADMIN — KETOROLAC TROMETHAMINE 30 MG: 30 INJECTION, SOLUTION INTRAMUSCULAR; INTRAVENOUS at 06:09

## 2021-03-08 RX ADMIN — CEFAZOLIN 1000 MG: 1 INJECTION, POWDER, FOR SOLUTION INTRAMUSCULAR; INTRAVENOUS at 20:34

## 2021-03-08 RX ADMIN — SODIUM CITRATE AND CITRIC ACID MONOHYDRATE 30 ML: 500; 334 SOLUTION ORAL at 03:03

## 2021-03-08 ASSESSMENT — PULMONARY FUNCTION TESTS
PIF_VALUE: 0
PIF_VALUE: 1
PIF_VALUE: 0
PIF_VALUE: 1
PIF_VALUE: 0

## 2021-03-08 ASSESSMENT — PAIN SCALES - GENERAL
PAINLEVEL_OUTOF10: 3
PAINLEVEL_OUTOF10: 2

## 2021-03-08 NOTE — PROGRESS NOTES
Patient delivered living male infant at 18 by primary LTCS for failure to progress. Nuchal x1. APGAR 9/9. No complications. Baby and mother both stable.

## 2021-03-08 NOTE — FLOWSHEET NOTE
Patient admitted into room, oriented to surroundings and admission paperwork. Assessment completed and charted. Respirations easy and unlabored on room air. Pulse oximeter intact per protocol. Bilateral SCD's applied to lower extremities. Iv intact and asymptomatic. Hernandez draining clear yellow urine. Patient offered and encouraged to drink fluids. [facility], and contents explained. Instructed on safe sleep. Verbal consent given for baby to have Hepatis B Vaccine. Insentive spirometer given and instructions given. Phone at patient's side, instructed to use it for any needs. Bed in lowest position. Side rails x2. Call light within reach.

## 2021-03-08 NOTE — OP NOTE
Operative Note      Patient: Mecca Cross  YOB: 1995  MRN: [id number]    Date of Procedure: 3/8/2021    Pre-Op Diagnosis: High risk pregnancy at 44 weeks 2 days;  polyhydramnios; GBS positive; dilation at 5 cm; meconium and amniotic fluid; maternal fever during labor; history of marijuana use -UDS positive during admission; history of vaping; maternal varicella nonimmune; mixed anxiety depressive disorder on Zoloft during the course of pregnancy; iron deficiency anemia pregnancy  Patient Active Problem List   Diagnosis    Medication exposure during first trimester of pregnancy (Zoloft throuhout the pregnancy)    Polyhydramnios in third trimester    39 2/7 weeks gestation of pregnancy    History of marijuana use - first trimester    Engages in [facility] Pregnancy in third trimester with history of     Maternal varicella, non-immune    Mixed anxiety depressive disorder - on Zoloft    GBS (group B Streptococcus carrier), +RV culture, currently pregnant    High-risk pregnancy, third trimester - followed with Dr Dory Pat of dilation, 5 cm, current hospitalization    Meconium in amniotic fluid    Maternal fever during labor    Iron deficiency anemia of pregnancy     Post-Op Diagnosis: Same viable male infant delivered, right occiput posterior: Nuchal cord x1  Patient Active Problem List   Diagnosis    Medication exposure during first trimester of pregnancy (Zoloft throuhout the pregnancy)    Polyhydramnios in third trimester    39 2/7 weeks gestation of pregnancy    History of marijuana use - first trimester    Engages in [facility] Pregnancy in third trimester with history of     Maternal varicella, non-immune    Mixed anxiety depressive disorder - on Zoloft    GBS (group B Streptococcus carrier), +RV culture, currently pregnant    High-risk pregnancy, third trimester - followed with Dr Dory Pat of dilation, 5 cm, current hospitalization    Meconium in amniotic fluid    Maternal fever during labor     delivery, delivered, current hospitalization    Term birth of  male   Martha Talley Right occiput posterior presentation of fetus    Nuchal cord without compression, delivered, current hospitalization    Iron deficiency anemia of pregnancy     Procedure(s):   SECTION: Primary Low Transverse  Section Via Pfannenstiel Skin Incision    Surgeon(s): Rowland Lanes, MD    Assistant: John Medellin MD    Anesthesia: Spinal    Complications: None    Estimated Blood Loss (mL): 600    Fluids Replaced (mL): 1500    Urine Output (mL): 150      Drains:   Urethral Catheter Non-latex 16 fr (Active)   $ Urethral catheter insertion Inserted for procedure 21   Catheter Indications Perioperative use in selected surgeries including but not limited to urologic, pelvic or need for intraoperative monitoring of urinary output due to prolonged surgery, large volume infusion or need for diuretic therapy in surgery 21   Site Assessment Pink;Moist 21   Urine Color Yellow 21   Urine Appearance Clear 21     Implants:  * No implants in log *    Intraoperative Medications:  Ancef 2 g IV on-call to the OR, Pitocin drip IV after delivery placenta    Indication For Procedure: 25-year-old white female  2 para 2-0-1-0 who presented as a late transfer from Dr. Tito Bowlingper polyhydramnios at 38 weeks and 6 days with a recommendation for labor induction at 43 weeks by maternal-fetal medicine Dr. Lia Nicole has been taking Zoloft 100 mg for mixed anxiety depressive disorder throughout the course of her pregnancy and also has a history of marijuana use and vaping.  She was diagnosed with muscular dystrophy in childhood but has not any complications this during her pregnancy.  She was having some mild irregular contractions on admission but denied leaking fluid, vaginal bleeding.  She has no symptoms of UTI or PIH.  There is good fetal movement. On presentation her cervix was 1 cm / 50%/soft/posterior and ballotable. BS = 4. Cytotec was initiated. And clindamycin started for GBS prophylaxis. She received 2 doses of Cytotec when spontaneous rupture membranes occurred at 1509. An IUPC was placed at approximately 1800. Patient's labor progressed spontaneously until approximately  when Pitocin augmentation was initiated. Patient slowly progressed to 5 cm / 90%/-1 station where cervical dilation arrested in spite of adequate tractions as determined by [place] units for greater than 3-1/2 hours. During that timeframe, at proximately 2100 maternal temperature spike to 101 °F and meconium fluid was noted. A dose of gentamicin IV was given. At this point with the arrest of dilation at 5 cm for greater than 3 hours, new onset maternal fever and meconium stained amniotic fluid the decision was made to proceed with a primary low transverse  delivery. Management options were reviewed, the risks of surgery were discussed, including, but not limited to that of anesthesia, infection, hemorrhage, transfusion, blood borne disease, bowel/bladder/ureteral/vascular injury, and any corrective surgeries (bladder, ureter, bowel, hysterectomy), uterine or cervical lacerations, injury to the baby, DVT,PE, pain and death. Questions were answered to both the patient's and FOB/family satisfaction and informed consent was obtained to proceed as planned. Prepare for primary C/S. See orders per Dr. Kiko Mckeon MD.     Findings:  Live Born 2021 at [place] [address]  Sex: Male  Fetal Position:  Cephalic, right occiput posterior  Apgars:  1 minute:  9; 5 minute:  9  Weight:  7# 9oz, 3440 grams  Nuchal Cord: was present and reduced  Amniotic fluid: Meconium stained  Placenta: was delivered with gentle traction and was noted to be intact, whole and that the umbilical cord had three vessels noted.   Tubes, uterus, ovaries:  Normal    Specimens:  placenta sent to pathology    Condition:    Infant stable, transfer to postanesthesia recovery then to [address]  Mother stable, transfer to post anesthesia recovery then to Maternity    Detailed Description of Procedure: The patient was taken to the operating room, where patient identification was confirmed and a Time Out was performed. Duramorph spinal anesthesia was found to be adequate. She was prepped with a DuraPrep solution and draped in the usual sterile fashion in the dorsal supine position with leftward tilt. A Pfannenstiel skin incision was then made with a scalpel and carried through to the underlying lay of fascia with the Bovie. The fascia was nicked in the midline and incision was extended laterally with the Teixeira scissors. Superior aspect of the fascial incision was grasped with Kocher clamps and elevated. The underlying rectus muscle was dissected using sharp and blunt dissection. Attention was then turned to the inferior aspect of this incision, which in a similar fashion was grasped with Kocher clamps and elevated. The underlying rectus muscle was dissected off using sharp and blunt dissection. The rectus muscles were then  in the midline, peritoneum identified, tented up, and entered with the Metzenbaum scissors. The peritoneal incision was extended superiorly and inferiorly with good visualization of the bladder. The bladder blade was then inserted and the vesicouterine peritoneum identified, grasped with pickups, entered sharply with Metzenbaum scissors. This incision was extended laterally and the bladder flap created digitally. The bladder blade was then reinserted and the lower uterine segment incised in a transverse fasion with the scalpel. The uterine incision was then extended laterally with blunt digital dissection.   The bladder blade was then removed and the infant's head delivered atraumatically with the appropriate amount of fundal pressure. The nose and mouth were suctioned with bulb suction and the remaining infant was delivered. The cord was clamped and cut after a 30 second delay. The infant was handed off to the waiting nurses. The placenta was then removed with gentle cord traction and the uterus exteriorized and cleared of all clot and debris. The uterine incision was then repaired with 1 Chromic in a running, locked fashion. A second layer of the same suture was used to obtain excellent hemostasis. The bladder flap was repaired with 3-0 Vicryl in a running stitch and the uterus was returned to the abdomen. The gutters were then cleared of all clot and debris. The anterior abdominal wall peritoneum was closed with a running stitch of 3-0 Vicryl. The fascia was reapproximated with 1 Stratafix in a running fashion. The subcutaneous tissue was reapproximated in two layers, using a running stitch of 3-0 plain suture through Raymundo's fascia followed by an interrupted subdermal stitch. The skin was closed with a running subcuticular stitch of 4-0 Stratafix. Dermabond skin glue was applied as a sterile dressing. The patient tolerated the procedure well. Sponge, lap, needle, and instruments were correct x2. Two g of Ancef were given on call to the OR, followed by Pitocin drip after delivery of the placenta.   The patient was taken to the Recovery Room in awake, stable, postoperative state    Electronically signed by Bill Brandt MD on 3/8/2021 at 5:04 AM

## 2021-03-08 NOTE — PROGRESS NOTES
Called to room 9 pt complaining of lower abdominal pain and pressure at 2000 bp 134/77 hr 77 oxygen sat 100 epidural placement confirmed prior to bolus. Adequate block noted l2_L3. Bupivacaine 0.25 percent 2003 and 3 cc at 2008 bp 134/82 hr 74 oxygen sat 100% room air pt rating pain prior to bolus 9 post bolus at 6.   At 2013 pt rates pain at 3 bp 129/82 hr 76 oxygen sat 100 complaining of rectal pressure at this time and on arrival to room

## 2021-03-08 NOTE — PROGRESS NOTES
Called CRNA regarding pain 9/10 with previously effective epidural, despite PCA button, repositioning, and bladder emptied. No significant change in SVE.

## 2021-03-08 NOTE — PROGRESS NOTES
L&D PROGRESS NOTE:    Patient:  Raciel Manrique     Admit Date:  3/7/2021  5:54 AM  Medical Record Number:  [id number]   Today's Date: 3/8/2021    S: .  76-year-old white female  2 para 2-0-1-0 who presented as a late transfer from Dr. Kyaw Chopra with polyhydramnios at 38 weeks and 6 days with a recommendation for labor induction at 39 weeks by maternal-fetal medicine Dr. Silverio Robin. Patient has been taking Zoloft 100 mg for mixed anxiety depressive disorder throughout the course of her pregnancy and also has a history of marijuana use and vaping. She was diagnosed with muscular dystrophy in childhood but has not any complications this during her pregnancy. She was having some mild irregular contractions on admission but denied leaking fluid, vaginal bleeding. She has no symptoms of UTI or PIH. There is good fetal movement. On presentation her cervix was 1 cm / 50%/soft/posterior and ballotable. BS = 4. Cytotec was initiated. And clindamycin started for GBS prophylaxis. She received 2 doses of Cytotec when spontaneous rupture membranes occurred at 1509. An IUPC was placed at approximately 1800. Patient's labor progressed spontaneously until approximately  when Pitocin augmentation was initiated. Patient slowly progressed to 5 cm / 90%/-1 station where cervical dilation arrested in spite of adequate tractions as determined by [place] units for greater than 3-1/2 hours. During that timeframe, at proximately 2100 maternal temperature spike to 101 °F and meconium fluid was noted. A dose of gentamicin IV was given. At this point with the arrest of dilation at 5 cm for greater than 3 hours, new onset maternal fever and meconium stained amniotic fluid the decision was made to proceed with a primary low transverse  delivery.     O:   Vitals:    21 0217 21 0222 217 21   BP:  116/62     Pulse:  94     Resp:  16     Temp:  98.9 °F (37.2 °C)  98.9 °F (37.2 °C) TempSrc:  Oral  Oral   SpO2: 96% 96% 95%    Weight:       Height:         FHR: Category 1 tracing  Cervix: 5 cm / 50% / soft / -1  TOCO:  2 minutes  Membranes: Ruptured meconium stained. A:25 y.o. W female at 38w3d   Polyhydramnios -recommended for delivery by Dr. Shira Olivera  GBS positive  Late transfer at 45 weeks 6 days from Dr. Sherren Atkinson practice  History of medication exposure during pregnancy -Zoloft 10 mg daily  History of anxiety depressive disorder  History of marijuana use and vaping during pregnancy  Maternal varicella nonimmune  Iron deficiency anemia pregnancy  Patient Active Problem List   Diagnosis    Medication exposure during first trimester of pregnancy (Zoloft throuhout the pregnancy)    Polyhydramnios in third trimester    39 2/7 weeks gestation of pregnancy    History of marijuana use - first trimester    Engages in [address] Pregnancy in third trimester with history of     Maternal varicella, non-immune    Mixed anxiety depressive disorder - on Zoloft    GBS (group B Streptococcus carrier), +RV culture, currently pregnant    High-risk pregnancy, third trimester - followed with Dr Nieves Ragsdale of dilation, 5 cm, current hospitalization    Meconium in amniotic fluid    Maternal fever during labor    Iron deficiency anemia of pregnancy     Fetal status: Reassuring  GBS: positive    P: Management options were reviewed, the risks of surgery were discussed, including, but not limited to that of anesthesia, infection, hemorrhage, transfusion, blood borne disease, bowel/bladder/ureteral/vascular injury, and any corrective surgeries (bladder, ureter, bowel, hysterectomy), uterine or cervical lacerations, injury to the baby, DVT,PE, pain and death. Questions were answered to both the patient's and FOB/family satisfaction and informed consent was obtained to proceed as planned. Prepare for primary C/S.  See orders per Dr. Palmer Linder MD.    Catherine Harp Annelise Vargas M.D.  FACOG  3/8/2021 3:29 AM

## 2021-03-08 NOTE — LACTATION NOTE
Instructed on normal infant behavior in the first 12-24 hrs, benefits of skin to skin and components of safe positioning, encouraged rooming-in and avoidance of pacifier use until breastfeeding is well established. Reviewed waking techniques and the importance of frequent feedings- 8-12 times/ 24 hrs. Taught how to recognize feeding cues. Reviewed expected urine/stool output. Encouraged to feed infant as often and for as long as the infant wishes to do so. Reviewed [place] learning ligia. Assisted mom with positioning using the football hold, cradle and cross cradle hold. We also tried laid back position. Mom expressed 20 drops of colostrum into baby's mouth using both breasts. Baby is eager to nurse and he roots well but has not latched. Encouraged mom to keep baby skin to skin as much as possible and bring him to the breast often.

## 2021-03-08 NOTE — PROGRESS NOTES
Updated Dr. Molina Round 5/90/-1 and no longer feels edematous, temp 98.6 approx an hour ago. He would like a one-time dose of gentamycin IV given after her currently infusing dose of clindamycin is done.

## 2021-03-08 NOTE — ANESTHESIA PROCEDURE NOTES
Spinal Block    Patient location during procedure: OB  Start time: 3/8/2021 3:32 AM  End time: 3/8/2021 3:37 AM  Reason for block: primary anesthetic and at surgeon's request  Staffing  Anesthesiologist: Elie Mccray MD  Resident/CRNA: BELÉN Rojas CRNA  Preanesthetic Checklist  Completed: patient identified, IV checked, site marked, risks and benefits discussed, surgical consent, monitors and equipment checked, pre-op evaluation, timeout performed, anesthesia consent given, oxygen available and patient being monitored  Spinal Block  Patient position: sitting  Prep: ChloraPrep  Patient monitoring: cardiac monitor, continuous pulse ox, continuous capnometry and frequent blood pressure checks  Approach: midline  Location: L3/L4  Provider prep: mask, sterile gloves and sterile gown  Local infiltration: lidocaine  Dose: 0.2  Agent: bupivacaine  Adjuvant: duramorph  Dose: 1.2  Dose: 1.2  Needle  Needle type: Pencan   Needle gauge: 25 G  Needle length: 3.5 in  Assessment  Sensory level: T6  Swirl obtained: Yes  CSF: clear  Attempts: 1  Hemodynamics: stable

## 2021-03-08 NOTE — OP NOTE
PreOp DX:  39w2d Pregnancy     Failure to progress         Post OP Dx:  Same + live male                             Procedure:   section / Assistance     Anesthesia:  Spinal         Under spinal anesthesia the abdomen was prepared and draped . In the absence of a resident I assisted  Dr. Juanpablo Esparza who performed a  section, with retraction, exposure and delivery of a live infant and suture management/cutting throughout the case. Then the uterus and the abdomen were closed. Procedure was well tolerated.

## 2021-03-08 NOTE — PROGRESS NOTES
Updated Dr. Anton Fried of unchanged SVE with cervical swelling.  Order given to recheck and call in an hour

## 2021-03-08 NOTE — PROGRESS NOTES
Pt tolerating po fluids, urine output is adequate. Encouraged pt to increase po fluid intake. Hernandez removed pt rhina well. Ambulated to BR but unable to void at this point. Patient decided to sit up in reclining chair with feet elevated. Working on incentive spirometer and C&DB with rolled blanket to splint incision.

## 2021-03-08 NOTE — PROGRESS NOTES
Called to room 9 pt complaining of rectal pressure per nurse pt remain 5cc. Epidural placement confirmed some oozing at site. Site reinforced.   Adequate block  Noted L2-L3.  vss pt sitting 90 degrees ok for lido with epi  2% 8cc divided doses bp 1 hr 143/81 hr 79 oxygen sat 98 on room air

## 2021-03-08 NOTE — LACTATION NOTE
This note was copied from a baby's chart. Mom is resting. Encouraged her to call when baby is back from the nursery. Briefly discussed benefits of colostrum.   Kenneth, [address] [facility]

## 2021-03-08 NOTE — LACTATION NOTE
Helped mom with positioning. Encouraged mom to stay skin to skin as much as possible. Went over how to latch. Encouraged her to finger feed drops of colostrum when baby shows interest in feeding.   Kenneth, [address] [facility]

## 2021-03-08 NOTE — PROGRESS NOTES
Assumed pt care. Report from [address] [place] [address],[address]. Patient resting in bed. Has epidural, but is experiencing breakthrough lower back and abdominal pain 9/10 w/contractions. Gave PCA epidural button and explained to pt. Will continue to assess. Call light in reach.

## 2021-03-08 NOTE — PROGRESS NOTES
Updated Dr. Brenda Nash on:  SVE 4-5/90/-1  Epidural bolused by CRNA  Ctx Q2M, [place] units calculated over the last hour, highest was 185  Temp 100.1    Orders given by Dr. Brenda Nash:  Start pitocin augmentation  Monitor temp per protocol

## 2021-03-08 NOTE — PROGRESS NOTES
0012 4 cc lido with epi  rebolus Dr Darren Butterfield at bedside and 0018 4 cc bolus vss.  Post bolus pt comfortable vss nurse at [facility]

## 2021-03-09 PROBLEM — D62 POSTOPERATIVE ANEMIA DUE TO ACUTE BLOOD LOSS: Status: ACTIVE | Noted: 2021-03-09

## 2021-03-09 LAB
BASOPHILS ABSOLUTE: 0.07 E9/L (ref 0–0.2)
BASOPHILS RELATIVE PERCENT: 0.5 % (ref 0–2)
EOSINOPHILS ABSOLUTE: 0.08 E9/L (ref 0.05–0.5)
EOSINOPHILS RELATIVE PERCENT: 0.6 % (ref 0–6)
HCT VFR BLD CALC: 22.8 % (ref 34–48)
HEMOGLOBIN: 7.1 G/DL (ref 11.5–15.5)
IMMATURE GRANULOCYTES #: 0.25 E9/L
IMMATURE GRANULOCYTES %: 1.9 % (ref 0–5)
LYMPHOCYTES ABSOLUTE: 2.01 E9/L (ref 1.5–4)
LYMPHOCYTES RELATIVE PERCENT: 15.6 % (ref 20–42)
MCH RBC QN AUTO: 25.4 PG (ref 26–35)
MCHC RBC AUTO-ENTMCNC: 31.1 % (ref 32–34.5)
MCV RBC AUTO: 81.4 FL (ref 80–99.9)
MONOCYTES ABSOLUTE: 0.52 E9/L (ref 0.1–0.95)
MONOCYTES RELATIVE PERCENT: 4 % (ref 2–12)
NEUTROPHILS ABSOLUTE: 9.96 E9/L (ref 1.8–7.3)
NEUTROPHILS RELATIVE PERCENT: 77.4 % (ref 43–80)
PDW BLD-RTO: 13.8 FL (ref 11.5–15)
PLATELET # BLD: 214 E9/L (ref 130–450)
PMV BLD AUTO: 10.4 FL (ref 7–12)
RBC # BLD: 2.8 E12/L (ref 3.5–5.5)
REASON FOR REJECTION: NORMAL
REJECTED TEST: NORMAL
WBC # BLD: 12.9 E9/L (ref 4.5–11.5)

## 2021-03-09 PROCEDURE — 85025 COMPLETE CBC W/AUTO DIFF WBC: CPT

## 2021-03-09 PROCEDURE — 6370000000 HC RX 637 (ALT 250 FOR IP): Performed by: OBSTETRICS & GYNECOLOGY

## 2021-03-09 PROCEDURE — 36415 COLL VENOUS BLD VENIPUNCTURE: CPT

## 2021-03-09 PROCEDURE — 1220000000 HC SEMI PRIVATE OB R&B

## 2021-03-09 PROCEDURE — 2580000003 HC RX 258: Performed by: OBSTETRICS & GYNECOLOGY

## 2021-03-09 PROCEDURE — 6360000002 HC RX W HCPCS: Performed by: ANESTHESIOLOGY

## 2021-03-09 RX ADMIN — FERROUS SULFATE TAB 325 MG (65 MG ELEMENTAL FE) 325 MG: 325 (65 FE) TAB at 17:11

## 2021-03-09 RX ADMIN — DOCUSATE SODIUM 100 MG: 100 CAPSULE, LIQUID FILLED ORAL at 19:52

## 2021-03-09 RX ADMIN — FERROUS SULFATE TAB 325 MG (65 MG ELEMENTAL FE) 325 MG: 325 (65 FE) TAB at 10:26

## 2021-03-09 RX ADMIN — SODIUM CHLORIDE, PRESERVATIVE FREE 10 ML: 5 INJECTION INTRAVENOUS at 01:40

## 2021-03-09 RX ADMIN — METRONIDAZOLE 500 MG: 500 TABLET ORAL at 22:55

## 2021-03-09 RX ADMIN — METRONIDAZOLE 500 MG: 500 TABLET ORAL at 06:30

## 2021-03-09 RX ADMIN — KETOROLAC TROMETHAMINE 30 MG: 30 INJECTION, SOLUTION INTRAMUSCULAR; INTRAVENOUS at 01:41

## 2021-03-09 RX ADMIN — METRONIDAZOLE 500 MG: 500 TABLET ORAL at 13:14

## 2021-03-09 RX ADMIN — SERTRALINE 100 MG: 100 TABLET, FILM COATED ORAL at 19:52

## 2021-03-09 RX ADMIN — IBUPROFEN 600 MG: 600 TABLET, FILM COATED ORAL at 13:13

## 2021-03-09 RX ADMIN — IBUPROFEN 600 MG: 600 TABLET, FILM COATED ORAL at 06:30

## 2021-03-09 RX ADMIN — METFORMIN HYDROCHLORIDE 1 TABLET: 500 TABLET, EXTENDED RELEASE ORAL at 10:27

## 2021-03-09 RX ADMIN — DOCUSATE SODIUM 100 MG: 100 CAPSULE, LIQUID FILLED ORAL at 10:26

## 2021-03-09 RX ADMIN — IBUPROFEN 600 MG: 600 TABLET, FILM COATED ORAL at 19:52

## 2021-03-09 ASSESSMENT — PAIN DESCRIPTION - LOCATION: LOCATION: INCISION

## 2021-03-09 ASSESSMENT — PAIN SCALES - GENERAL
PAINLEVEL_OUTOF10: 6
PAINLEVEL_OUTOF10: 5
PAINLEVEL_OUTOF10: 6

## 2021-03-09 ASSESSMENT — PAIN DESCRIPTION - ONSET: ONSET: ON-GOING

## 2021-03-09 NOTE — LACTATION NOTE
Assisted with feeding. Baby sleepy, sucking on tongue. Used shield for latch- baby still sleepy at breast- non- nutritive sucking on and off for 5 minutes. Set up Symphony pump at bedside, will pump to stimulate her milk production, supplement with formula as needed Plan to offer feeds q 3 hr, pump & supplement by bottle if baby sleepy, does not feed well.  Education given on ways to awaken baby , signs of adequate I & O.

## 2021-03-09 NOTE — CARE COORDINATION
This note will not be viewable in [employer] for the following reason(s). [place]: Unable to separate mother vs.  [address] Discharge Planning     SW called [facility] ( [phone]) and spoke with , Gerardo Geronimo, who reported that [facility] ( [phone]) will NOT be involved at this time. PLAN    Baby CAN be discharged home when medically ready, children services will NOT be involved at this time.       Electronically signed by TAN Macias on 3/9/2021 at 2:21 PM

## 2021-03-09 NOTE — PLAN OF CARE
Problem: Fluid Volume - Imbalance:  Goal: Absence of postpartum hemorrhage signs and symptoms  Description: Absence of postpartum hemorrhage signs and symptoms  Outcome: Met This Shift  Goal: Absence of imbalanced fluid volume signs and symptoms  Description: Absence of imbalanced fluid volume signs and symptoms  Outcome: Met This Shift     Problem: Infection - Surgical Site:  Goal: Will show no infection signs and symptoms  Description: Will show no infection signs and symptoms  Outcome: Met This Shift     Problem: Mood - Altered:  Goal: Mood stable  Description: Mood stable  3/9/2021 1157 by Tl Gamboa RN  Outcome: Met This Shift  3/9/2021 0237 by Kimberly Villafuerte RN  Outcome: Met This Shift     Problem: Nausea/Vomiting:  Goal: Absence of nausea/vomiting  Description: Absence of nausea/vomiting  Outcome: Met This Shift     Problem: Pain - Acute:  Goal: Pain level will decrease  Description: Pain level will decrease  3/9/2021 1157 by Tl Gamboa RN  Outcome: Met This Shift  3/9/2021 0237 by Kimberly Villafuerte RN  Outcome: Met This Shift     Problem: Urinary Retention:  Goal: Urinary elimination within specified parameters  Description: Urinary elimination within specified parameters  Outcome: Met This Shift     Problem: Venous Thromboembolism:  Goal: Will show no signs or symptoms of venous thromboembolism  Description: Will show no signs or symptoms of venous thromboembolism  Outcome: Met This Shift  Goal: Absence of signs or symptoms of impaired coagulation  Description: Absence of signs or symptoms of impaired coagulation  Outcome: Met This Shift
Problem: Mood - Altered:  Goal: Mood stable  Description: Mood stable  3/9/2021 0237 by Opal Valverde RN  Outcome: Met This Shift     Problem: Pain - Acute:  Goal: Pain level will decrease  Description: Pain level will decrease  3/9/2021 0237 by Opal Valverde RN  Outcome: Met This Shift
Problem: Pain - Acute:  Goal: Pain level will decrease  Description: Pain level will decrease  Outcome: Completed  Goal: Able to cope with pain  Description: Able to cope with pain  Outcome: Completed     Problem: Urinary Retention:  Goal: Experiences of bladder distention will decrease  Description: Experiences of bladder distention will decrease  Outcome: Completed  Goal: Urinary elimination within specified parameters  Description: Urinary elimination within specified parameters  Outcome: Completed     Problem: Pain:  Description: Pain management should include both nonpharmacologic and pharmacologic interventions.   Goal: Pain level will decrease  Description: Pain level will decrease  Outcome: Completed  Goal: Control of acute pain  Description: Control of acute pain  Outcome: Completed  Goal: Control of chronic pain  Description: Control of chronic pain  Outcome: Completed     Problem: Infection - Surgical Site:  Goal: Will show no infection signs and symptoms  Description: Will show no infection signs and symptoms  3/8/2021 1634 by Charmayne Dyke, RN  Outcome: Met This Shift  3/8/2021 1036 by Charmayne Dyke, RN  Outcome: Met This Shift     Problem: Fluid Volume - Imbalance:  Goal: Absence of postpartum hemorrhage signs and symptoms  Description: Absence of postpartum hemorrhage signs and symptoms  3/8/2021 1634 by Charmayne Dyke, RN  Outcome: Met This Shift  3/8/2021 1036 by Charmayne Dyke, RN  Outcome: Met This Shift  Goal: Absence of imbalanced fluid volume signs and symptoms  Description: Absence of imbalanced fluid volume signs and symptoms  3/8/2021 1634 by Charmayne Dyke, RN  Outcome: Met This Shift  3/8/2021 1036 by Charmayne Dyke, RN  Outcome: Met This Shift     Problem: Mood - Altered:  Goal: Mood stable  Description: Mood stable  3/8/2021 1634 by Charmayne Dyke, RN  Outcome: Met This Shift  3/8/2021 1036 by Charmayne Dyke, RN  Outcome: Met This Shift
maternal oxygen supplementation to prevent nonreassu . ..   Goal: Absence of abnormal fetal heart rate pattern  Description: Absence of abnormal fetal heart rate pattern  Outcome: Completed

## 2021-03-09 NOTE — CARE COORDINATION
This note will not be viewable in MyChart for the following reason(s). : Unable to separate mother vs.  [address]       Discharge Planning   SW received consult for \" mother and baby  + for atif\" ( Positive UDS for mother on 3/7, baby on 3/9 for THC. Baby was also positive for fentanyl, however mother had epidural.)     SW met with Alexandro Waller ( [phone]) first time mother to baby boy Kirk Jackson ( 3/8/21) and introduced self and role. Jessenia's , Brandin Diaz ( [employer]) Vandana Fragoso ( 84) was also present in the room. Steve Frank gave SW permission to speak freely in front of Langley Runner. Steve Frank reported that she and Reji reside together at the address listed in the chart along with Reji's 6year old son, Aden Delgado, from a previous relationship. Steve Frank stated she works remotely for a [employer] in [place]; baby will be added to her [employer]. Per Steve Zac, prenatal care was with Dr. Dorothea Elizabeth, and pediatric care will be with Dr. Marcia Holloway. Steve Frank Reported that she has all needed items including a car seat and pack and play. We discussed safe sleep practices. Steve Frank was agreeable to a [facility] referral. Steve Frank  denied any past or current history of children services involvement, legal issues, substance abuse aside from [facility], or domestic. Steve Frank did report having anxiety and depression, and stated she is prescribed Zoloft. We discussed awareness of Post Partum Depression and encouraged contact with her OB if any problems arise. SW address Jessenia's positive UDS for THC, and she reported that she smoked THC to help with her muscular dystrophy diagnosis. Steve Frank and Reji expressed understanding for the need of [facility] ( [phone]) referral.     During assessment both parents easily engaged in conversation and spoke lovingly about both baby and Emery. When baby was brought into the room, both parents were affectionate and attentive to baby.        SW completed [facility] ( [phone]) referral to Crissy Briceno in intake.        PLAN  [facility] and [facility] referrals were completed     Baby can NOT be discharged home until [facility] ( [phone]) provides disposition  SW to continue communication with nursing staff and [facility] ( [phone])     Electronically signed by TAN Mcnally on 3/9/2021 at 11:50 AM

## 2021-03-10 VITALS
BODY MASS INDEX: 29.64 KG/M2 | RESPIRATION RATE: 18 BRPM | HEART RATE: 70 BPM | HEIGHT: 61 IN | SYSTOLIC BLOOD PRESSURE: 128 MMHG | OXYGEN SATURATION: 99 % | TEMPERATURE: 98.8 F | WEIGHT: 157 LBS | DIASTOLIC BLOOD PRESSURE: 84 MMHG

## 2021-03-10 PROBLEM — O09.899 MATERNAL VARICELLA, NON-IMMUNE: Status: RESOLVED | Noted: 2021-03-07 | Resolved: 2021-03-10

## 2021-03-10 PROBLEM — O99.820 GBS (GROUP B STREPTOCOCCUS CARRIER), +RV CULTURE, CURRENTLY PREGNANT: Status: RESOLVED | Noted: 2021-03-07 | Resolved: 2021-03-10

## 2021-03-10 PROBLEM — Z28.39 MATERNAL VARICELLA, NON-IMMUNE: Status: RESOLVED | Noted: 2021-03-07 | Resolved: 2021-03-10

## 2021-03-10 PROBLEM — F12.91 HISTORY OF MARIJUANA USE: Status: RESOLVED | Noted: 2021-03-07 | Resolved: 2021-03-10

## 2021-03-10 PROBLEM — O99.019 IRON DEFICIENCY ANEMIA OF PREGNANCY: Status: RESOLVED | Noted: 2021-03-08 | Resolved: 2021-03-10

## 2021-03-10 PROBLEM — O09.293 PREGNANCY IN THIRD TRIMESTER WITH HISTORY OF ABORTION: Status: RESOLVED | Noted: 2021-03-07 | Resolved: 2021-03-10

## 2021-03-10 PROBLEM — O09.93 HIGH-RISK PREGNANCY, THIRD TRIMESTER: Status: RESOLVED | Noted: 2021-03-07 | Resolved: 2021-03-10

## 2021-03-10 PROBLEM — D50.9 IRON DEFICIENCY ANEMIA OF PREGNANCY: Status: RESOLVED | Noted: 2021-03-08 | Resolved: 2021-03-10

## 2021-03-10 PROCEDURE — 6370000000 HC RX 637 (ALT 250 FOR IP): Performed by: OBSTETRICS & GYNECOLOGY

## 2021-03-10 RX ORDER — SIMETHICONE 80 MG
80 TABLET,CHEWABLE ORAL EVERY 6 HOURS PRN
Refills: 3 | COMMUNITY
Start: 2021-03-10 | End: 2021-03-15

## 2021-03-10 RX ORDER — MODIFIED LANOLIN
1 OINTMENT (GRAM) TOPICAL
COMMUNITY
Start: 2021-03-10 | End: 2021-03-15

## 2021-03-10 RX ORDER — FERROUS SULFATE 325(65) MG
325 TABLET ORAL
Qty: 30 TABLET | Refills: 1 | Status: SHIPPED | OUTPATIENT
Start: 2021-03-10

## 2021-03-10 RX ORDER — IBUPROFEN 600 MG/1
600 TABLET ORAL EVERY 6 HOURS PRN
Qty: 30 TABLET | Refills: 0 | Status: SHIPPED | OUTPATIENT
Start: 2021-03-10 | End: 2021-04-22

## 2021-03-10 RX ORDER — PSEUDOEPHEDRINE HCL 30 MG
100 TABLET ORAL 2 TIMES DAILY PRN
COMMUNITY
Start: 2021-03-10 | End: 2021-03-15

## 2021-03-10 RX ADMIN — IBUPROFEN 600 MG: 600 TABLET, FILM COATED ORAL at 05:47

## 2021-03-10 RX ADMIN — METFORMIN HYDROCHLORIDE 1 TABLET: 500 TABLET, EXTENDED RELEASE ORAL at 10:08

## 2021-03-10 RX ADMIN — IBUPROFEN 600 MG: 600 TABLET, FILM COATED ORAL at 11:55

## 2021-03-10 RX ADMIN — FERROUS SULFATE TAB 325 MG (65 MG ELEMENTAL FE) 325 MG: 325 (65 FE) TAB at 10:08

## 2021-03-10 ASSESSMENT — PAIN SCALES - GENERAL
PAINLEVEL_OUTOF10: 5
PAINLEVEL_OUTOF10: 3

## 2021-03-10 ASSESSMENT — PAIN DESCRIPTION - PAIN TYPE: TYPE: SURGICAL PAIN

## 2021-03-10 ASSESSMENT — PAIN DESCRIPTION - ONSET: ONSET: ON-GOING

## 2021-03-10 ASSESSMENT — PAIN DESCRIPTION - LOCATION: LOCATION: INCISION

## 2021-03-10 NOTE — DISCHARGE INSTR - ACTIVITY
After Your Delivery Discharge Instructions    What to do after you leave the hospital:  Recommended diet: regular diet    Recommended activity: No driving for 2 weeks. No sex or tampon use for 6 weeks. No douching. No heavy lifting (greater than baby and carrier) for 6 weeks. Initially, avoid frequent ambulation with stairs - start slowly then increase as tolerated. You may return to work in 10 -8 weeks. Showers are fine - avoid bath tubs, hot tubs, swimming. Follow-up in 1 week for incision check and in 6 weeks for final postpartum visit.     Call the Physician with any of these  signs and symptoms:    Warning signs regarding incision:  · \"Popping\" of stitches or staples  · Foul smelling discharge or pus  · More redness or streaks around surgical incision than before    Incision care:  · Keep incision uncovered  · No tub baths until OK'd by your Physician      After your delivery - signs and symptoms to watch for:  · Fever - Oral temperature greater than 100.4 degrees Fahrenheit  · Foul-smelling vaginal discharge  · Headache unrelieved by \"pain meds\"  · Difficulty urinating  · Breasts reddened, hard, hot to the touch  · Nipple discharge which is foul-smelling or contains pus  · Increased pain at the site of the surgical incision  · Difficulty breathing with or without chest pain  · New calf pain especially if only on one side  · Sudden, continuing increased vaginal bleeding (heavier than a period) with or without clots  · Unrelieved feelings of:  · Inability to cope  · Sadness  · Anxiety  · Lack of interest in baby  · Insomnia  · Crying     What to do at home:  · Resume activity gradually   · Don't lift anything heavier than baby and carrier until OK'd by your Physician   · No sex until OK'd by your Physician  · Eat regular nutritious meals  · Take pain medication as prescribed whenever you need them  · To avoid/relieve constipation take stool softeners if advised   · Increase fiber in your diet    Most importantly ENJOY your Baby!  - Dr. Wheeler Odor =)))    Please call immediately with Questions and Concerns - [phone] (Office) or [phone] (Answering Service) after hours and weekends. By signing below, I understand that if any emergent problems occur, once I leave the hospital, I am to dial #[phone] or go immediately to the nearest Emergency Room. For less emergent matters,  Dr. Baljeet Snyder can be reached by calling his Office or  answering service at the above numbers. I understand and acknowledge receipt of the instructions indicated above.

## 2021-03-10 NOTE — LACTATION NOTE
Mom reported baby latched successfully earlier today. Gave parents literature on tongue sucking and how to correct it. Encouraged parents to call us with breastfeeding concerns.   Kenneth, [address] [facility]

## 2021-03-10 NOTE — DISCHARGE SUMMARY
Department of Obstetrics and Gynecology  Labor and Delivery  Discharge Summary    Patient:  Leatha Pimentel         Medical Record Number:  [id number]    Admit Date:  3/7/2021  5:54 AM    Discharge Date: 3/10/2021    Final Diagnosis:   Principal Problem:    44 2/7 weeks gestation of pregnancy  Active Problems:    Postoperative anemia due to acute blood loss    Engages in vaping    Mixed anxiety depressive disorder - on Zoloft  Resolved Problems:    High-risk pregnancy, third trimester - followed with Dr Shira Olivera    Medication exposure during first trimester of pregnancy (Zoloft throuhout the pregnancy)    Polyhydramnios in third trimester    GBS (group B Streptococcus carrier), +RV culture, currently pregnant    Arrest of dilation, 5 cm, current hospitalization    Meconium in amniotic fluid    Maternal fever during labor     delivery, delivered, current hospitalization    Term birth of  male    Right occiput posterior presentation of fetus    Nuchal cord without compression, delivered, current hospitalization    Iron deficiency anemia of pregnancy    History of marijuana use - first trimester    Pregnancy in third trimester with history of     Maternal varicella, non-immune    Chief Complaint - Presenting Illness - Physical Findings:   39 weeks gestation of pregnancy [Z3A.39]  HPI: Indication For Procedure: 47-year-old white female  2 para 2-0-1-0 who presented as a late transfer from Dr. Layton Appl polyhydramnios at 38 weeks and 6 days with a recommendation for labor induction at 44 weeks by maternal-fetal medicine Dr. Nadja Mendes has been taking Zoloft 100 mg for mixed anxiety depressive disorder throughout the course of her pregnancy and also has a history of marijuana use and vaping.  She was diagnosed with muscular dystrophy in childhood but has not any complications this during her pregnancy.  She was having some mild irregular contractions on admission but denied leaking fluid, vaginal bleeding.  She has no symptoms of UTI or PIH.  There is good fetal movement.  On presentation her cervix was 1 cm / 50%/soft/posterior and ballotable.  BS = 4.  Cytotec was initiated.  And clindamycin started for GBS prophylaxis.  She received 2 doses of Cytotec when spontaneous rupture membranes occurred at 1509.  An IUPC was placed at approximately 1800.  Patient's labor progressed spontaneously until approximately  when Pitocin augmentation was initiated.  Patient slowly progressed to 5 cm / 90%/-1 station where cervical dilation arrested in spite of adequate tractions as determined by [place] units for greater than 3-1/2 hours.  During that timeframe, at proximately 2100 maternal temperature spike to 101 °F and meconium fluid was noted.  A dose of gentamicin IV was given.  At this point with the arrest of dilation at 5 cm for greater than 3 hours, new onset maternal fever and meconium stained amniotic fluid the decision was made to proceed with a primary low transverse  delivery. Management options were reviewed, the risks of surgery were discussed, including, but not limited to that of anesthesia, infection, hemorrhage, transfusion, blood borne disease, bowel/bladder/ureteral/vascular injury, and any corrective surgeries (bladder, ureter, bowel, hysterectomy), uterine or cervical lacerations, injury to the baby, DVT,PE, pain and death.  Questions were answered to both the patient's and FOB/family satisfaction and informed consent was obtained to proceed as planned. Prepare for primary C/S.     Hospital Course:   Delivery Summary:  Date of Procedure: 3/8/2021     Pre-Op Diagnosis: High risk pregnancy at 39 weeks 2 days;  polyhydramnios; GBS positive; dilation at 5 cm; meconium and amniotic fluid; maternal fever during labor; history of marijuana use -UDS positive during admission; history of vaping; maternal varicella nonimmune; mixed anxiety depressive disorder on Zoloft during the course of pregnancy; iron deficiency anemia pregnancy      Patient Active Problem List   Diagnosis    Medication exposure during first trimester of pregnancy (Zoloft throuhout the pregnancy)    Polyhydramnios in third trimester    39 2/7 weeks gestation of pregnancy    History of marijuana use - first trimester    Engages in [employer] Pregnancy in third trimester with history of     Maternal varicella, non-immune    Mixed anxiety depressive disorder - on Zoloft    GBS (group B Streptococcus carrier), +RV culture, currently pregnant    High-risk pregnancy, third trimester - followed with Dr Abad Luciano of dilation, 5 cm, current hospitalization    Meconium in amniotic fluid    Maternal fever during labor    Iron deficiency anemia of pregnancy      Post-Op Diagnosis: Same viable male infant delivered, right occiput posterior: Nuchal cord x1      Patient Active Problem List   Diagnosis    Medication exposure during first trimester of pregnancy (Zoloft throuhout the pregnancy)    Polyhydramnios in third trimester    39 2/7 weeks gestation of pregnancy    History of marijuana use - first trimester    Engages in [employer] Pregnancy in third trimester with history of     Maternal varicella, non-immune    Mixed anxiety depressive disorder - on Zoloft    GBS (group B Streptococcus carrier), +RV culture, currently pregnant    High-risk pregnancy, third trimester - followed with Dr Abad Luciano of dilation, 5 cm, current hospitalization    Meconium in amniotic fluid    Maternal fever during labor     delivery, delivered, current hospitalization    Term birth of  male   [employer] Right occiput posterior presentation of fetus    Nuchal cord without compression, delivered, current hospitalization    Iron deficiency anemia of pregnancy      Procedure(s): Primary Low Transverse  Section Via Pfannenstiel Skin Incision     Surgeon(s): Beryle Evan, MD     Assistant: Zion Lemon MD     Anesthesia: Spinal     Complications: None     Estimated Blood Loss (mL): 600     Fluids Replaced (mL): 1500     Urine Output (mL): 150      Drains:        Urethral Catheter Non-latex 16 fr (Active)   $ Urethral catheter insertion Inserted for procedure 03/08/21 0258   Catheter Indications Perioperative use in selected surgeries including but not limited to urologic, pelvic or need for intraoperative monitoring of urinary output due to prolonged surgery, large volume infusion or need for diuretic therapy in surgery 03/08/21 0258   Site Assessment Pink;Moist 03/08/21 0258   Urine Color Yellow 03/08/21 0258   Urine Appearance Clear 03/08/21 0258      Implants:  * No implants in log *     Intraoperative Medications: Ancef 2 g IV on-call to the OR, Pitocin drip IV after delivery placenta     Findings:  Live Born March 8, 2023 at [place] [address]  Sex: Male  Fetal Position:  Cephalic, right occiput posterior  Apgars:  1 minute:  9; 5 minute:  9  Weight:  7# 9oz, 3440 grams  Nuchal Cord: was present and reduced  Amniotic fluid: Meconium stained  Placenta: was delivered with gentle traction and was noted to be intact, whole and that the umbilical cord had three vessels noted. Tubes, uterus, ovaries:  Normal     Specimens:  placenta sent to pathology     Condition:    Infant stable, transfer to postanesthesia recovery then to [place] [address] Nursery  Mother stable, transfer to post anesthesia recovery then to Maternity     The patient was transferred to the recovery room with her IV, Hernandez, and SCD's from OR in place, where she was given IV Toradol to supplement her Duramorph Spinal for pain management. Her IV antibiotics were continued for 24 hour coverage. On the first postoperative day her IV, IVF, IV medications, SCD's and Hernandez were discontinued. She was started on PO pain meds ( Motrin 600 mg). She was encouraged to advance her diet and activities as tolerated.     The patient had Patient. · Special Problems: None    Plans For Continuing Care:  · Unreported Test Results and Intended Follow-Up: 1 week(s) time. · Instructions for Physical Activity: No driving for 2 week(s). No sex or tampon use for 6 weeks. No douching. No heavy lifting (greater than baby and carrier) for 6 weeks. Frequent ambulation with stairs - start slowly then increase as tolerated. Return to work in 10 -8 weeks. Showers are fine - avoid bath tubs, hot tubs, swimming. · Instructions for Diet: Regular diet  · Discharge Medications:  Current Discharge Medication List      START taking these medications    Details   ibuprofen (ADVIL;MOTRIN) 600 MG tablet Take 1 tablet by mouth every 6 hours as needed for Pain  Qty: 30 tablet, Refills: 0      lansinoh lanolin CREA ointment Apply 1 applicator topically every hour as needed for Dry Skin (nipple discomfort)      ferrous sulfate (IRON 325) 325 (65 Fe) MG tablet Take 1 tablet by mouth daily (with breakfast)  Qty: 30 tablet, Refills: 1      docusate sodium (COLACE, DULCOLAX) 100 MG CAPS Take 100 mg by mouth 2 times daily as needed for Constipation      simethicone (MYLICON) 80 MG chewable tablet Take 1 tablet by mouth every 6 hours as needed for Flatulence  Refills: 3         CONTINUE these medications which have NOT CHANGED    Details   Prenatal MV-Min-Fe Fum-FA-DHA (PRENATAL 1 PO) Take 1 tablet by mouth daily      sertraline (ZOLOFT) 100 MG tablet Take 100 mg by mouth daily            Follow-Up Visit Plan: In 1 week for incision check and in 6-8 weeks for final postpartum visit.  Disposition: Home    Time Spent on Discharge:  30 minutes were spent in patient examination, evaluation, counseling as well as medication reconciliation, prescriptions for required medications, discharge plan and follow up.       Florecita Hernandez MD,FACOG    3/10/2021 12:55 PM

## 2021-03-10 NOTE — PROGRESS NOTES
Assessment as charted. Fundus firm -1, scant amount of lochia, rubra- no clots. Denies any calf pain or tenderness. Educated on s/s of DVT. Heels elevated d/t patient with history of muscular dystrophy and bilateral foot drop. Instructed on  circumcision care and all questions answered. Patient declines pain medication at this time. Instructed to call for any needs.

## 2021-03-10 NOTE — PROGRESS NOTES
CLINICAL PHARMACY NOTE: MEDS TO [address] [place] [address] Drive Select Patient?: No  Total # of Prescriptions Filled: 2   The following medications were delivered to the patient:  · Ferrous sulfate 325 mg  · Ibuprofen 600 mg  Total # of Interventions Completed: 8  Time Spent (min): 75    Additional Documentation:

## 2021-03-10 NOTE — PROGRESS NOTES
POD #2    Patient:  Nabeel Bautista     Admit Date:  3/7/2021  5:54 AM  Medical Record Number:  [id number]   Today's Date: 3/10/2021    S: No complaints -requesting an early day 2 discharge; tolerating diet: yes -general; ambulating well: yes -up in room and in halls; voiding without difficulty:  yes -no urinary complaints; bm: yes -normal; flatus: yes -normal; pain meds appropriate: yes -ibuprofen only, not using Percocet; vaginal bleeding: Less than a period.     O:   Vitals:    03/09/21 0754 03/09/21 1700 03/09/21 2332 03/10/21 0705   BP: 121/74 (!) 119/59 135/76 128/84   Pulse: 72 59 67 70   Resp: 16 16 16 18   Temp: 98.1 °F (36.7 °C) 98.1 °F (36.7 °C) 98.1 °F (36.7 °C) 98.8 °F (37.1 °C)   TempSrc: Oral Oral Oral Oral   SpO2:       Weight:       Height:         Gen: A&O, cooperative, pleasant   Heart: RRR   Lungs: CTA b/l   Abd; soft, NT, non distended, +BS  Back: no CVA or paraspinal tenderness   Ext: No clubbing, cyanosis, edema:1+ , no cords palpable, no calf tenderness   Neuro: intact   Inc: clean, dry, intact with Dermabond  Uterus: firm, well contracted, nt   Nolvia pad: staining only, thin lochia    Recent Results (from the past 72 hour(s))   SPECIMEN REJECTION    Collection Time: 03/09/21  4:09 AM   Result Value Ref Range    Rejected Test cbcwd     Reason for Rejection see below    CBC WITH AUTO DIFFERENTIAL    Collection Time: 03/09/21  5:50 AM   Result Value Ref Range    WBC 12.9 (H) 4.5 - 11.5 E9/L    RBC 2.80 (L) 3.50 - 5.50 E12/L    Hemoglobin 7.1 (L) 11.5 - 15.5 g/dL    Hematocrit 22.8 (L) 34.0 - 48.0 %    MCV 81.4 80.0 - 99.9 fL    MCH 25.4 (L) 26.0 - 35.0 pg    MCHC 31.1 (L) 32.0 - 34.5 %    RDW 13.8 11.5 - 15.0 fL    Platelets 514 226 - 412 E9/L    MPV 10.4 7.0 - 12.0 fL    Neutrophils % 77.4 43.0 - 80.0 %    Immature Granulocytes % 1.9 0.0 - 5.0 %    Lymphocytes % 15.6 (L) 20.0 - 42.0 %    Monocytes % 4.0 2.0 - 12.0 %    Eosinophils % 0.6 0.0 - 6.0 %    Basophils % 0.5 0.0 - 2.0 %    Neutrophils Absolute 9.96 (H) 1.80 - 7.30 E9/L    Immature Granulocytes # 0.25 E9/L    Lymphocytes Absolute 2.01 1.50 - 4.00 E9/L    Monocytes Absolute 0.52 0.10 - 0.95 E9/L    Eosinophils Absolute 0.08 0.05 - 0.50 E9/L    Basophils Absolute 0.07 0.00 - 0.20 E9/L       A: 30 y.o. female  at 39w3d questing day #2 discharge. POD#2 S/P primary low transverse  section for arrest of dilation at 5 cm, meconium in the amniotic fluid and maternal fever during labor  Marijuana use during pregnancy, UDS positive on admission  History of vaping during pregnancy  Maternal varicella nonimmune  Mixed anxiety depressive disorder on Zoloft  Iron deficiency anemia pregnancy with superimposed anemia of acute blood loss -hemodynamically stable -on iron replacement therapy  Patient Active Problem List   Diagnosis    Medication exposure during first trimester of pregnancy (Zoloft throuhout the pregnancy)    Polyhydramnios in third trimester    39 2/7 weeks gestation of pregnancy    History of marijuana use - first trimester    Engages in [address] Pregnancy in third trimester with history of     Maternal varicella, non-immune    Mixed anxiety depressive disorder - on Zoloft    GBS (group B Streptococcus carrier), +RV culture, currently pregnant    High-risk pregnancy, third trimester - followed with Dr Jairo Kanner of dilation, 5 cm, current hospitalization    Meconium in amniotic fluid    Maternal fever during labor     delivery, delivered, current hospitalization    Term birth of  male   [facility] Right occiput posterior presentation of fetus    Nuchal cord without compression, delivered, current hospitalization    Iron deficiency anemia of pregnancy    Postoperative anemia due to acute blood loss       P: Routine post-op care. Discharge home with instructions, precautions. Prescription for ibuprofen 600 mg. May continue over-the-counter Simethicone and Colace PRN.   Continue PNV with Iron; Rx ferrous sulfate 325 daily in addition to prenatal vitamin. Follow-up office 1 week for incision check, and 6-8 weeks for final post-op visit.     Estevan Holstein, MD [address] [place] [address]  3/10/2021 12:47 PM

## 2021-03-10 NOTE — PROGRESS NOTES
POD #1    Patient:  Dileep Miller     Admit Date:  3/7/2021  5:54 AM  Medical Record Number:  [id number]   Today's Date: 3/9/2021    S: No complaints; tolerating diet: yes -general; ambulating well: yes -up in room and in halls. Actually ate dinner in the cafeteria.; voiding without difficulty:  yes -has no urinary complaints; bm: yes -x3; flatus: yes -normal; pain meds appropriate: yes -Duramorph spinal and intravenous Toradol first 24 hours, now oral ibuprofen; vaginal bleeding: Less than a period.     O:   Vitals:    03/09/21 0325 03/09/21 0754 03/09/21 1700 03/09/21 2332   BP: 110/69 121/74 (!) 119/59 135/76   Pulse: 77 72 59 67   Resp: 16 16 16 16   Temp: 97.6 °F (36.4 °C) 98.1 °F (36.7 °C) 98.1 °F (36.7 °C) 98.1 °F (36.7 °C)   TempSrc: Oral Oral Oral Oral   SpO2:       Weight:       Height:         Gen: A&O, cooperative, pleasant   Heart: RRR   Lungs: CTA b/l   Abd; soft, NT, non distended, +BS  Back: no CVA or paraspinal tenderness   Ext: No clubbing, cyanosis, edema:2+ , no cords palpable, no calf tenderness   Neuro: intact   Inc: clean, dry, intact with Dermabond  Uterus: firm, well contracted, nt   Nolvia pad: staining only, thin lochia    Recent Results (from the past 72 hour(s))   Urine Drug Screen    Collection Time: 03/07/21  6:45 AM   Result Value Ref Range    Amphetamine Screen, Urine NOT DETECTED Negative <1000 ng/mL    Barbiturate Screen, Ur NOT DETECTED Negative < 200 ng/mL    Benzodiazepine Screen, Urine NOT DETECTED Negative < 200 ng/mL    Cannabinoid Scrn, Ur POSITIVE (A) Negative < 50ng/mL    Cocaine Metabolite Screen, Urine NOT DETECTED Negative < 300 ng/mL    Opiate Scrn, Ur NOT DETECTED Negative < 300ng/mL    PCP Screen, Urine NOT DETECTED Negative < 25 ng/mL    Methadone Screen, Urine NOT DETECTED Negative <300 ng/mL    Oxycodone Urine NOT DETECTED Negative <100 ng/mL    FENTANYL SCREEN, URINE NOT DETECTED Negative <1 ng/mL    Drug Screen Comment: see below    TYPE AND SCREEN    Collection Time: 21  6:45 AM   Result Value Ref Range    ABO/Rh O POS     Antibody Screen NEG    CBC    Collection Time: 21  6:45 AM   Result Value Ref Range    WBC 8.6 4.5 - 11.5 E9/L    RBC 3.86 3.50 - 5.50 E12/L    Hemoglobin 9.5 (L) 11.5 - 15.5 g/dL    Hematocrit 30.9 (L) 34.0 - 48.0 %    MCV 80.1 80.0 - 99.9 fL    MCH 24.6 (L) 26.0 - 35.0 pg    MCHC 30.7 (L) 32.0 - 34.5 %    RDW 13.3 11.5 - 15.0 fL    Platelets 040 178 - 697 E9/L    MPV 10.9 7.0 - 12.0 fL   SPECIMEN REJECTION    Collection Time: 21  4:09 AM   Result Value Ref Range    Rejected Test cbcwd     Reason for Rejection see below    CBC WITH AUTO DIFFERENTIAL    Collection Time: 21  5:50 AM   Result Value Ref Range    WBC 12.9 (H) 4.5 - 11.5 E9/L    RBC 2.80 (L) 3.50 - 5.50 E12/L    Hemoglobin 7.1 (L) 11.5 - 15.5 g/dL    Hematocrit 22.8 (L) 34.0 - 48.0 %    MCV 81.4 80.0 - 99.9 fL    MCH 25.4 (L) 26.0 - 35.0 pg    MCHC 31.1 (L) 32.0 - 34.5 %    RDW 13.8 11.5 - 15.0 fL    Platelets 656 835 - 943 E9/L    MPV 10.4 7.0 - 12.0 fL    Neutrophils % 77.4 43.0 - 80.0 %    Immature Granulocytes % 1.9 0.0 - 5.0 %    Lymphocytes % 15.6 (L) 20.0 - 42.0 %    Monocytes % 4.0 2.0 - 12.0 %    Eosinophils % 0.6 0.0 - 6.0 %    Basophils % 0.5 0.0 - 2.0 %    Neutrophils Absolute 9.96 (H) 1.80 - 7.30 E9/L    Immature Granulocytes # 0.25 E9/L    Lymphocytes Absolute 2.01 1.50 - 4.00 E9/L    Monocytes Absolute 0.52 0.10 - 0.95 E9/L    Eosinophils Absolute 0.08 0.05 - 0.50 E9/L    Basophils Absolute 0.07 0.00 - 0.20 E9/L       A: 30 y.o. female  at 39w3d  POD#1 S/P primary low transverse  section for arrest of dilation at 5 cm, meconium in the amniotic fluid and maternal fever during labor  Marijuana use during pregnancy, UDS positive on admission  History of vaping during pregnancy  Maternal varicella nonimmune  Mixed anxiety depressive disorder on Zoloft  Iron deficiency anemia pregnancy with superimposed anemia of acute blood loss

## 2021-03-11 LAB — CANNABINOIDS CONF, URINE: >500 NG/ML

## 2021-03-11 NOTE — PROGRESS NOTES
Pt states ready for discharge; discharge instructions given to pt w/ understanding verbalized; home Rx delivered by pharmacy meds to beds; pt denies any questions; pt discharged in apparently stable condition to home w/ baby.

## 2022-01-10 ENCOUNTER — HOSPITAL ENCOUNTER (EMERGENCY)
Age: 27
Discharge: LEFT BEFORE DISCHARGE | End: 2022-01-10

## 2022-01-10 VITALS
RESPIRATION RATE: 14 BRPM | WEIGHT: 110 LBS | DIASTOLIC BLOOD PRESSURE: 59 MMHG | BODY MASS INDEX: 21.6 KG/M2 | HEART RATE: 96 BPM | HEIGHT: 60 IN | TEMPERATURE: 97.2 F | SYSTOLIC BLOOD PRESSURE: 93 MMHG | OXYGEN SATURATION: 100 %

## 2022-01-10 RX ORDER — ONDANSETRON 2 MG/ML
4 INJECTION INTRAMUSCULAR; INTRAVENOUS ONCE
Status: DISCONTINUED | OUTPATIENT
Start: 2022-01-10 | End: 2022-01-11 | Stop reason: HOSPADM

## 2022-01-10 ASSESSMENT — PAIN DESCRIPTION - ORIENTATION
ORIENTATION: LOWER
ORIENTATION: LOWER

## 2022-01-10 ASSESSMENT — PAIN DESCRIPTION - LOCATION
LOCATION: BACK
LOCATION: BACK

## 2022-01-10 ASSESSMENT — PAIN DESCRIPTION - DESCRIPTORS: DESCRIPTORS: ACHING;CONSTANT

## 2022-01-10 ASSESSMENT — PAIN DESCRIPTION - PAIN TYPE
TYPE: ACUTE PAIN
TYPE: ACUTE PAIN

## 2022-01-10 ASSESSMENT — PAIN SCALES - GENERAL
PAINLEVEL_OUTOF10: 9
PAINLEVEL_OUTOF10: 9

## 2022-01-10 ASSESSMENT — PAIN DESCRIPTION - FREQUENCY: FREQUENCY: CONTINUOUS

## 2022-01-10 ASSESSMENT — PAIN DESCRIPTION - ONSET: ONSET: GRADUAL

## 2022-01-10 ASSESSMENT — PAIN DESCRIPTION - PROGRESSION: CLINICAL_PROGRESSION: GRADUALLY WORSENING

## 2022-01-11 NOTE — ED NOTES
Patient called from waiting room and there was no answer.        Praimeence MASOOD Alva  01/10/22 [id number]

## 2022-01-11 NOTE — ED NOTES
Patient called from waiting room for protocols and there was no answer.        Peter Villegas RN  01/10/22 [id number]

## (undated) DEVICE — CATHETERIZATION KIT FOL16 FR 2000 CC DRAINAGE BG LUBRICATH

## (undated) DEVICE — CONTAINER,SPEC,PNEUM TUBE,3OZ,STRL PATH: Brand: MEDLINE

## (undated) DEVICE — TUBE BLD COLLECT ST 1 SIL COAT 7ML 10ML

## (undated) DEVICE — SUTURE VCRL + SZ 3-0 L36IN ABSRB UD L36MM CT-1 1/2 CIR VCP944H

## (undated) DEVICE — SUTURE CHROMIC GUT SZ 1 L36IN ABSRB BRN L40MM CT 1/2 CIR 915H

## (undated) DEVICE — GLOVE SURG SZ 65 L12IN FNGR THK83MIL CRM POLYISOPRENE

## (undated) DEVICE — ELECTRODE PT RET AD L9FT HI MOIST COND ADH HYDRGEL CORDED

## (undated) DEVICE — COVER,LIGHT HANDLE,FLX,2/PK: Brand: MEDLINE INDUSTRIES, INC.

## (undated) DEVICE — SUTURE CHROMIC GUT SZ 2-0 L36IN ABSRB BRN L36MM CT-1 1/2 923H

## (undated) DEVICE — TOWEL,OR,DSP,ST,BLUE,STD,6/PK,12PK/CS: Brand: MEDLINE

## (undated) DEVICE — GOWN,SIRUS,FABRNF,L,20/CS: Brand: MEDLINE

## (undated) DEVICE — PEN: MARKING STD 100/CS: Brand: MEDICAL ACTION INDUSTRIES

## (undated) DEVICE — 3000CC GUARDIAN II: Brand: GUARDIAN

## (undated) DEVICE — APPLICATOR PREP 26ML 0.7% IOD POVACRYLEX 74% ISO ALC ST

## (undated) DEVICE — TUBING, SUCTION, 3/16" X 12', STRAIGHT: Brand: MEDLINE

## (undated) DEVICE — SPONGE LAP W18XL18IN WHT COT 4 PLY FLD STRUNG RADPQ DISP ST

## (undated) DEVICE — CESAREAN BIRTH PACK II: Brand: MEDLINE INDUSTRIES, INC.

## (undated) DEVICE — SUTURE VCRL + SZ 3-0 L27IN ABSRB UD L26MM SH 1/2 CIR VCP416H

## (undated) DEVICE — PENCIL ES L3M BTTN SWCH HOLSTER W/ BLDE ELECTRD EDGE

## (undated) DEVICE — SHEET,DRAPE,53X77,STERILE: Brand: MEDLINE

## (undated) DEVICE — SUTURE STRATAFIX SYMMETRIC SZ 1 L18IN ABSRB VLT CT1 L36CM SXPP1A404

## (undated) DEVICE — Device: Brand: PORTEX

## (undated) DEVICE — BLADE SURG NO20 S STL STR DISP GLASSVAN

## (undated) DEVICE — HYPODERMIC SAFETY NEEDLE: Brand: MAGELLAN

## (undated) DEVICE — CONTAINER SPEC 64OZ POLYPR PATH SNAP LOK CAP W/ LID

## (undated) DEVICE — MEDI-VAC YANKAUER SUCTION HANDLE W/BULBOUS TIP: Brand: CARDINAL HEALTH

## (undated) DEVICE — GOWN,SIRUS,POLYRNF,BRTHSLV,XLN/XL,20/CS: Brand: MEDLINE

## (undated) DEVICE — GLOVE SURG SZ 75 L12IN FNGR THK83MIL CRM POLYISOPRENE

## (undated) DEVICE — SUTURE PLN GUT SZ 3-0 L27IN ABSRB YELLOWISH TAN L36MM CT-1 842H

## (undated) DEVICE — COUNTER NDL 30 COUNT DBL MAG